# Patient Record
Sex: FEMALE | Race: WHITE | NOT HISPANIC OR LATINO | Employment: FULL TIME | URBAN - NONMETROPOLITAN AREA
[De-identification: names, ages, dates, MRNs, and addresses within clinical notes are randomized per-mention and may not be internally consistent; named-entity substitution may affect disease eponyms.]

---

## 2018-02-23 ENCOUNTER — OFFICE VISIT (OUTPATIENT)
Dept: FAMILY MEDICINE CLINIC | Facility: CLINIC | Age: 43
End: 2018-02-23

## 2018-02-23 VITALS
HEART RATE: 99 BPM | HEIGHT: 61 IN | TEMPERATURE: 98.1 F | SYSTOLIC BLOOD PRESSURE: 148 MMHG | OXYGEN SATURATION: 89 % | DIASTOLIC BLOOD PRESSURE: 92 MMHG | WEIGHT: 151 LBS | BODY MASS INDEX: 28.51 KG/M2

## 2018-02-23 DIAGNOSIS — I10 BENIGN HYPERTENSION: Primary | ICD-10-CM

## 2018-02-23 DIAGNOSIS — M54.6 ACUTE THORACIC BACK PAIN, UNSPECIFIED BACK PAIN LATERALITY: ICD-10-CM

## 2018-02-23 DIAGNOSIS — M79.602 LEFT ARM PAIN: ICD-10-CM

## 2018-02-23 DIAGNOSIS — Z00.00 ROUTINE ADULT HEALTH MAINTENANCE: ICD-10-CM

## 2018-02-23 DIAGNOSIS — R07.9 CHEST PAIN, UNSPECIFIED TYPE: ICD-10-CM

## 2018-02-23 PROBLEM — R20.2 TINGLING: Status: ACTIVE | Noted: 2018-02-23

## 2018-02-23 PROCEDURE — 99203 OFFICE O/P NEW LOW 30 MIN: CPT | Performed by: PHYSICIAN ASSISTANT

## 2018-02-23 PROCEDURE — 93000 ELECTROCARDIOGRAM COMPLETE: CPT | Performed by: PHYSICIAN ASSISTANT

## 2018-02-23 RX ORDER — LISINOPRIL 10 MG/1
10 TABLET ORAL DAILY
Qty: 30 TABLET | Refills: 2 | Status: SHIPPED | OUTPATIENT
Start: 2018-02-23 | End: 2018-06-07 | Stop reason: SDUPTHER

## 2018-02-23 RX ORDER — IBUPROFEN 200 MG
200 TABLET ORAL EVERY 6 HOURS PRN
COMMUNITY

## 2018-02-23 NOTE — PROGRESS NOTES
Subjective   Vashti Hair is a 42 y.o. female WHO PRESENTS AS A NEW PATIENT- ELEVATED BP AND CHEST PAIN X 2-3 WEEKS.     History of Present Illness     REPORTS SLEPT REALLY WELL LAST NIGHT.     USUALLY WAKES UP AT 3-4 AM AND ALARM DOESN'T GO OFF UNTIL 5 AM. MIND RACING AND CANNOT GO BACK TO SLEEP. NOT MORE BUSY, BUT IS WORRIED ABOUT DIFFERENT THINGS. DAUGHTER DRIVING AND GETTING OLDER AND LOOKING AT COLLEGES.     BILATERAL UPPER CHEST PAIN THAT GOES INTO SHOULDERS AND SUPRACLAVICULAR AREA. LEFT > RIGHT ARMS. PRESSURE INTO UPPER BACK. NOT ASSOCIATED WITH SOA, PALP, NAUSEA, DIZZINESS, DIAPHORESIS.     BLOOD PRESSURE- ELEVATED WITH ALL 3 PREGNANCIES. HAS NOT BEEN ELEVATED WHEN NOT PREGNANT. NOTED ELEVATED AT EYE DR 2/16/18- 160/103 THEN TOOK 2/19 WITH ANOTHER CUFF 138/110 THEN NURSE CHECKED AND IT /88 /90.     NO GI/ ISSUES. NO HEADACHES. NO CHANGE IN VISION- ABNORMALITIES OF EYE ON EXAM. OTHER NEUROLOGICAL SYMPTOMS.     HAS NOTED INCREASING FORGETFULNESS OVER THE LAST 17 YEARS. STAYED HOME WITH CHILDREN THE 1ST 5 YEARS.      SISTER WITH MENOPAUSE AT AGE 37 AND MOTHER WITH MENOPAUSE EARLY 40S. FATHER WITH HTN. MENSES NORMAL - ONCE MONTHLY. 3 DAYS.      The following portions of the patient's history were reviewed and updated as appropriate: allergies, current medications, past family history, past medical history, past social history, past surgical history and problem list.    Review of Systems   Respiratory: Positive for chest tightness.    Musculoskeletal: Positive for back pain.   Psychiatric/Behavioral: Positive for sleep disturbance. The patient is nervous/anxious.    All other systems reviewed and are negative.      Objective   Physical Exam   Constitutional: She is oriented to person, place, and time. She appears well-developed and well-nourished. No distress.   HENT:   Head: Normocephalic and atraumatic.   Right Ear: Hearing, tympanic membrane, external ear and ear canal normal.   Left Ear:  Hearing, tympanic membrane, external ear and ear canal normal.   Nose: Nose normal.   Mouth/Throat: Oropharynx is clear and moist.   Eyes: Conjunctivae, EOM and lids are normal. Pupils are equal, round, and reactive to light.   Neck: Neck supple. No JVD present. Carotid bruit is not present. No tracheal deviation present. No thyroid mass and no thyromegaly present.   Cardiovascular: Normal rate, regular rhythm, normal heart sounds and intact distal pulses.  Exam reveals no gallop and no friction rub.    No murmur heard.  Pulses:       Radial pulses are 2+ on the right side, and 2+ on the left side.        Posterior tibial pulses are 2+ on the right side, and 2+ on the left side.   Pulmonary/Chest: Effort normal and breath sounds normal. No respiratory distress. She has no wheezes. She has no rhonchi. She has no rales.   Abdominal: Soft. Normal aorta and bowel sounds are normal. She exhibits no distension and no abdominal bruit. There is no hepatosplenomegaly. There is no tenderness. There is no rigidity, no rebound and no guarding. No hernia.   Musculoskeletal: Normal range of motion. She exhibits no edema, tenderness or deformity.   Normal strength.   Lymphadenopathy:     She has no cervical adenopathy.   Neurological: She is alert and oriented to person, place, and time. She has normal strength and normal reflexes. She displays normal reflexes. No cranial nerve deficit or sensory deficit. She exhibits normal muscle tone. Coordination and gait normal.   Skin: Skin is warm and dry. No rash noted. She is not diaphoretic. No erythema.   Psychiatric: She has a normal mood and affect. Her speech is normal and behavior is normal. Judgment and thought content normal. Cognition and memory are normal.     ECG 12 Lead  Date/Time: 2/23/2018 4:43 PM  Performed by: ELIS SCHROEDER  Authorized by: ELIS SCHROEDER   Comparison: not compared with previous ECG   Previous ECG: no previous ECG available  Rhythm: sinus rhythm  Rate:  normal  Conduction: non-specific intraventricular conduction delay  ST Elevation: II, V1 and V2  ST Depression: I and V5  T Waves: T waves normal  QRS axis: normal  Clinical impression: non-specific ECG  Comments: INDICATION: HTN AND CHEST PAIN WITH RADIATION TO SHOULDERS AND LEFT ARM          Assessment/Plan   Vashti was seen today for establish care and hypertension.    Diagnoses and all orders for this visit:    Benign hypertension  -     lisinopril (PRINIVIL,ZESTRIL) 10 MG tablet; Take 1 tablet by mouth Daily.  -     CBC & Differential  -     Comprehensive Metabolic Panel  -     Lipid Panel With LDL / HDL Ratio  -     Thyroid Panel With TSH  -     Vitamin D 25 Hydroxy  -     Urinalysis With Microscopic - Urine, Clean Catch  -     Ambulatory Referral to Cardiology  -     ECG 12 Lead    Routine adult health maintenance  -     CBC & Differential  -     Comprehensive Metabolic Panel  -     Lipid Panel With LDL / HDL Ratio  -     Thyroid Panel With TSH  -     Vitamin D 25 Hydroxy  -     Urinalysis With Microscopic - Urine, Clean Catch    Chest pain, unspecified type  -     Ambulatory Referral to Cardiology  -     ECG 12 Lead    Acute thoracic back pain, unspecified back pain laterality  -     Ambulatory Referral to Cardiology  -     ECG 12 Lead    Left arm pain  -     Ambulatory Referral to Cardiology  -     ECG 12 Lead      Patient Instructions   42 YEAR OLD MALE WHO PRESENTS TODAY AS A NEW PATIENT. SHE HAS PMH SIGNIFICANT FOR GESTATIONAL HTN WITH ALL 3 PREGNANCIES. SHE HAS OTHERWISE BEEN HEALTHY. PATIENT HAS BEEN OTHERWISE HEALTHY WITH INTERMITTENT ELEVATED BLOOD PRESSURES. X 2-3 WEEKS, SHE HAS HAD ELEVATED BLOOD PRESSURES AND INTERMITTENT PRESSURE BILATERALLY ACROSS CHEST, SHOULDERS, AND ARMS LEFT > RIGHT. SHE HAS NO ASSOCIATED SOA, PALP, WEAKNESS, DIZZINESS, NEUROLOGICAL SYMPTOMS. EKG TODAY IS BORDERLINE. PATIENT TO SEE CARDIOLOGY WITH SYMPTOMS AND EKG. PATIENT TO GO TO ER IF WORSENING SYMPTOMS OR ASSOCIATED  SYMPTOMS, OR CHANGE IN SYMPTOMS. I WILL START LISINOPRIL 10 MG ONCE DAILY. TO MONITOR BLOOD PRESSURE. PATIENT HAS HAD INCREASED STRESS AND WORRY AS WELL WITH CHILDREN. I ADVISED TO ENSURE SHE IS GETTING GOOD SLEEP AT NIGHT. WE WILL CONSIDER MEDICATION FOR ANXIETY/STRESS IF CONTINUED SYMPTOMS. PATIENT ALSO NEEDS FASTING LABS- TO RETURN FOR THIS AND CALL IF NO RESULTS IN 1 WEEK. PATIENT TO UPDATE ME ON BP IN 1 MONTH- WILL ALSO NEED BMP IN 2-4 WEEKS AFTER STARTING LISINOPRIL. COUNSELED ON DIET, EXERCISE, AND WEIGHT LOSS TODAY. PATIENT WILL WORK DILIGENTLY ON THIS.

## 2018-02-25 NOTE — PATIENT INSTRUCTIONS
42 YEAR OLD MALE WHO PRESENTS TODAY AS A NEW PATIENT. SHE HAS PMH SIGNIFICANT FOR GESTATIONAL HTN WITH ALL 3 PREGNANCIES. SHE HAS OTHERWISE BEEN HEALTHY. PATIENT HAS BEEN OTHERWISE HEALTHY WITH INTERMITTENT ELEVATED BLOOD PRESSURES. X 2-3 WEEKS, SHE HAS HAD ELEVATED BLOOD PRESSURES AND INTERMITTENT PRESSURE BILATERALLY ACROSS CHEST, SHOULDERS, AND ARMS LEFT > RIGHT. SHE HAS NO ASSOCIATED SOA, PALP, WEAKNESS, DIZZINESS, NEUROLOGICAL SYMPTOMS. EKG TODAY IS BORDERLINE. PATIENT TO SEE CARDIOLOGY WITH SYMPTOMS AND EKG. PATIENT TO GO TO ER IF WORSENING SYMPTOMS OR ASSOCIATED SYMPTOMS, OR CHANGE IN SYMPTOMS. I WILL START LISINOPRIL 10 MG ONCE DAILY. TO MONITOR BLOOD PRESSURE. PATIENT HAS HAD INCREASED STRESS AND WORRY AS WELL WITH CHILDREN. I ADVISED TO ENSURE SHE IS GETTING GOOD SLEEP AT NIGHT. WE WILL CONSIDER MEDICATION FOR ANXIETY/STRESS IF CONTINUED SYMPTOMS. PATIENT ALSO NEEDS FASTING LABS- TO RETURN FOR THIS AND CALL IF NO RESULTS IN 1 WEEK. PATIENT TO UPDATE ME ON BP IN 1 MONTH- WILL ALSO NEED BMP IN 2-4 WEEKS AFTER STARTING LISINOPRIL. COUNSELED ON DIET, EXERCISE, AND WEIGHT LOSS TODAY. PATIENT WILL WORK DILIGENTLY ON THIS.

## 2018-02-27 LAB
25(OH)D3+25(OH)D2 SERPL-MCNC: 21.9 NG/ML (ref 30–100)
ALBUMIN SERPL-MCNC: 4.3 G/DL (ref 3.5–5.2)
ALBUMIN/GLOB SERPL: 1.6 G/DL
ALP SERPL-CCNC: 44 U/L (ref 39–117)
ALT SERPL-CCNC: 15 U/L (ref 1–33)
APPEARANCE UR: CLEAR
AST SERPL-CCNC: 13 U/L (ref 1–32)
BACTERIA #/AREA URNS HPF: ABNORMAL /HPF
BASOPHILS # BLD AUTO: 0.05 10*3/MM3 (ref 0–0.2)
BASOPHILS NFR BLD AUTO: 0.6 % (ref 0–1.5)
BILIRUB SERPL-MCNC: 0.3 MG/DL (ref 0.1–1.2)
BILIRUB UR QL STRIP: NEGATIVE
BUN SERPL-MCNC: 13 MG/DL (ref 6–20)
BUN/CREAT SERPL: 15.5 (ref 7–25)
CALCIUM SERPL-MCNC: 9.3 MG/DL (ref 8.6–10.5)
CASTS URNS MICRO: ABNORMAL
CHLORIDE SERPL-SCNC: 100 MMOL/L (ref 98–107)
CHOLEST SERPL-MCNC: 166 MG/DL (ref 0–200)
CO2 SERPL-SCNC: 24.9 MMOL/L (ref 22–29)
COLOR UR: (no result)
CREAT SERPL-MCNC: 0.84 MG/DL (ref 0.57–1)
EOSINOPHIL # BLD AUTO: 0.28 10*3/MM3 (ref 0–0.7)
EOSINOPHIL NFR BLD AUTO: 3.3 % (ref 0.3–6.2)
EPI CELLS #/AREA URNS HPF: ABNORMAL /HPF
ERYTHROCYTE [DISTWIDTH] IN BLOOD BY AUTOMATED COUNT: 14.4 % (ref 11.7–13)
FT4I SERPL CALC-MCNC: 2 (ref 1.2–4.9)
GFR SERPLBLD CREATININE-BSD FMLA CKD-EPI: 74 ML/MIN/1.73
GFR SERPLBLD CREATININE-BSD FMLA CKD-EPI: 90 ML/MIN/1.73
GLOBULIN SER CALC-MCNC: 2.7 GM/DL
GLUCOSE SERPL-MCNC: 97 MG/DL (ref 65–99)
GLUCOSE UR QL: NEGATIVE
HCT VFR BLD AUTO: 42.9 % (ref 35.6–45.5)
HDLC SERPL-MCNC: 54 MG/DL (ref 40–60)
HGB BLD-MCNC: 13.8 G/DL (ref 11.9–15.5)
HGB UR QL STRIP: NEGATIVE
IMM GRANULOCYTES # BLD: 0.02 10*3/MM3 (ref 0–0.03)
IMM GRANULOCYTES NFR BLD: 0.2 % (ref 0–0.5)
KETONES UR QL STRIP: NEGATIVE
LDLC SERPL CALC-MCNC: 96 MG/DL (ref 0–100)
LDLC/HDLC SERPL: 1.77 {RATIO}
LEUKOCYTE ESTERASE UR QL STRIP: (no result)
LYMPHOCYTES # BLD AUTO: 2.51 10*3/MM3 (ref 0.9–4.8)
LYMPHOCYTES NFR BLD AUTO: 29.8 % (ref 19.6–45.3)
MCH RBC QN AUTO: 29.9 PG (ref 26.9–32)
MCHC RBC AUTO-ENTMCNC: 32.2 G/DL (ref 32.4–36.3)
MCV RBC AUTO: 92.9 FL (ref 80.5–98.2)
MONOCYTES # BLD AUTO: 0.67 10*3/MM3 (ref 0.2–1.2)
MONOCYTES NFR BLD AUTO: 7.9 % (ref 5–12)
NEUTROPHILS # BLD AUTO: 4.9 10*3/MM3 (ref 1.9–8.1)
NEUTROPHILS NFR BLD AUTO: 58.2 % (ref 42.7–76)
NITRITE UR QL STRIP: NEGATIVE
PH UR STRIP: 5.5 [PH] (ref 5–8)
PLATELET # BLD AUTO: 358 10*3/MM3 (ref 140–500)
POTASSIUM SERPL-SCNC: 3.8 MMOL/L (ref 3.5–5.2)
PROT SERPL-MCNC: 7 G/DL (ref 6–8.5)
PROT UR QL STRIP: (no result)
RBC # BLD AUTO: 4.62 10*6/MM3 (ref 3.9–5.2)
RBC #/AREA URNS HPF: ABNORMAL /HPF
SODIUM SERPL-SCNC: 140 MMOL/L (ref 136–145)
SP GR UR: 1.03 (ref 1–1.03)
T3RU NFR SERPL: 25 % (ref 24–39)
T4 SERPL-MCNC: 7.8 UG/DL (ref 4.5–12)
TRIGL SERPL-MCNC: 81 MG/DL (ref 0–150)
TSH SERPL DL<=0.005 MIU/L-ACNC: 7.38 UIU/ML (ref 0.45–4.5)
UROBILINOGEN UR STRIP-MCNC: (no result) MG/DL
VLDLC SERPL CALC-MCNC: 16.2 MG/DL (ref 5–40)
WBC # BLD AUTO: 8.43 10*3/MM3 (ref 4.5–10.7)
WBC #/AREA URNS HPF: ABNORMAL /HPF

## 2018-02-28 DIAGNOSIS — R82.90 ABNORMAL URINALYSIS: ICD-10-CM

## 2018-02-28 DIAGNOSIS — E03.9 HYPOTHYROIDISM, UNSPECIFIED TYPE: Primary | ICD-10-CM

## 2018-02-28 RX ORDER — LEVOTHYROXINE SODIUM 0.03 MG/1
25 TABLET ORAL DAILY
Qty: 30 TABLET | Refills: 1 | Status: SHIPPED | OUTPATIENT
Start: 2018-02-28 | End: 2018-06-07 | Stop reason: SDUPTHER

## 2018-03-13 ENCOUNTER — OFFICE VISIT (OUTPATIENT)
Dept: CARDIOLOGY | Facility: CLINIC | Age: 43
End: 2018-03-13

## 2018-03-13 VITALS
BODY MASS INDEX: 28.92 KG/M2 | DIASTOLIC BLOOD PRESSURE: 88 MMHG | WEIGHT: 153.2 LBS | SYSTOLIC BLOOD PRESSURE: 140 MMHG | RESPIRATION RATE: 16 BRPM | HEIGHT: 61 IN | HEART RATE: 80 BPM

## 2018-03-13 DIAGNOSIS — R07.2 PRECORDIAL PAIN: Primary | ICD-10-CM

## 2018-03-13 DIAGNOSIS — I10 ESSENTIAL HYPERTENSION: ICD-10-CM

## 2018-03-13 PROCEDURE — 93000 ELECTROCARDIOGRAM COMPLETE: CPT | Performed by: INTERNAL MEDICINE

## 2018-03-13 PROCEDURE — 99204 OFFICE O/P NEW MOD 45 MIN: CPT | Performed by: INTERNAL MEDICINE

## 2018-03-13 NOTE — PROGRESS NOTES
PATIENTINFORMATION    Date of Office Visit: 2018  Encounter Provider: Karen Ludwig MD  Place of Service: [facility] CARDIOLOGY  Patient Name: Vashti Hair  : 1975    Subjective:     Encounter Date:2018      Patient ID: Vashti Hair is a 42 y.o. female.      History of Present Illness    This is a pleasant lady with a history of hypertension, which started when she was pregnant.  Recently, she felt her blood pressure was up and indeed it was.  She has been started on lisinopril and now reports good blood pressure control at home.  She mentioned to her primary doctor that she was having some chest pressure.  She said it felt tight in her chest and shoulders.  This has gotten better since her blood pressure has been treated.  She thinks it may be related to anxiety.  It would generally come on when she was at rest.  She did not note any exacerbating or relieving factors.  She is generally active but has not been exercising regularly recently; however, when she does exert herself, she does not have any symptoms.        Review of Systems   Constitution: Negative for fever, malaise/fatigue, weight gain and weight loss.   HENT: Negative for ear pain, hearing loss, nosebleeds and sore throat.    Eyes: Negative for double vision, pain, vision loss in left eye and vision loss in right eye.   Cardiovascular:        See history of present illness.   Respiratory: Negative for cough, shortness of breath, sleep disturbances due to breathing, snoring and wheezing.    Endocrine: Negative for cold intolerance, heat intolerance and polyuria.   Skin: Negative for itching, poor wound healing and rash.   Musculoskeletal: Negative for joint pain, joint swelling and myalgias.   Gastrointestinal: Negative for abdominal pain, diarrhea, hematochezia, nausea and vomiting.   Genitourinary: Negative for hematuria and hesitancy.   Neurological: Negative for numbness, paresthesias and  "seizures.   Psychiatric/Behavioral: Negative for depression. The patient is nervous/anxious.            ECG 12 Lead  Date/Time: 3/13/2018 12:14 PM  Performed by: MANISHA MOSS  Authorized by: MANISHA MOSS   Comparison: compared with previous ECG from 2/23/2018  Similar to previous ECG  Rhythm: sinus rhythm  BPM: 80  Conduction: conduction normal  ST Segments: ST segments normal  T Waves: T waves normal  Clinical impression: normal ECG               Objective:     /88 (BP Location: Right arm, Patient Position: Sitting, Cuff Size: Adult)   Pulse 80   Resp 16   Ht 154.9 cm (61\")   Wt 69.5 kg (153 lb 3.2 oz)   BMI 28.95 kg/m²  Body mass index is 28.95 kg/m².     Physical Exam   Constitutional: She appears well-developed.   HENT:   Head: Normocephalic and atraumatic.   Eyes: Conjunctivae and lids are normal. Pupils are equal, round, and reactive to light. Lids are everted and swept, no foreign bodies found.   Neck: Normal range of motion. No JVD present. Carotid bruit is not present. No tracheal deviation present. No thyroid mass present.   Cardiovascular: Normal rate, regular rhythm and normal heart sounds.    Pulses:       Dorsalis pedis pulses are 2+ on the right side, and 2+ on the left side.   Pulmonary/Chest: Effort normal and breath sounds normal.   Abdominal: Normal appearance and bowel sounds are normal.   Musculoskeletal: Normal range of motion.   Neurological: She is alert. She has normal strength.   Skin: Skin is warm, dry and intact.   Psychiatric: She has a normal mood and affect. Her behavior is normal.   Vitals reviewed.      Lab Review:  Reviewed recent labs      Assessment/Plan:       1. Chest pain.  This has resolved.  It resolved with starting treatment for her blood pressure.  I encouraged her to start to exercise.  I told her, if she is having any symptoms with exertion, to let me know and we will get her back in for a stress test.  However, her chest pain was asymptomatic, so I do " not think any testing needs to be scheduled at this time, especially since she is no longer having symptoms.    2. Hypertension.  Her blood pressure is a bit high here in the office here today, but she reports good control at home.  I encouraged her to continue to monitor it.    I will see her back as needed.  I did encourage her to call me if she has any exertional symptoms.        Orders Placed This Encounter   Procedures   • ECG 12 Lead     This order was created via procedure documentation      Vashti Hair   Home Medication Instructions HARJIT:    Printed on:03/13/18 8375   Medication Information                      ibuprofen (ADVIL,MOTRIN) 200 MG tablet  Take 200 mg by mouth Every 6 (Six) Hours As Needed for Mild Pain .             levothyroxine (SYNTHROID, LEVOTHROID) 25 MCG tablet  Take 1 tablet by mouth Daily.             lisinopril (PRINIVIL,ZESTRIL) 10 MG tablet  Take 1 tablet by mouth Daily.                        Karen Ludwig MD  03/13/18  12:15 PM

## 2018-04-04 ENCOUNTER — TELEPHONE (OUTPATIENT)
Dept: FAMILY MEDICINE CLINIC | Facility: CLINIC | Age: 43
End: 2018-04-04

## 2018-04-04 DIAGNOSIS — E03.9 ACQUIRED HYPOTHYROIDISM: Primary | ICD-10-CM

## 2018-04-04 DIAGNOSIS — R31.9 HEMATURIA, UNSPECIFIED TYPE: ICD-10-CM

## 2018-04-04 NOTE — TELEPHONE ENCOUNTER
----- Message from Beatrice Camargo sent at 4/4/2018  2:20 PM EDT -----  Patient has appt for Tuesday 4-10-18 for a tsh dilcia and ua dilcia, I need an order  Thank you

## 2018-06-06 ENCOUNTER — OFFICE VISIT (OUTPATIENT)
Dept: FAMILY MEDICINE CLINIC | Facility: CLINIC | Age: 43
End: 2018-06-06

## 2018-06-06 VITALS
HEART RATE: 68 BPM | OXYGEN SATURATION: 98 % | HEIGHT: 61 IN | SYSTOLIC BLOOD PRESSURE: 120 MMHG | WEIGHT: 153.6 LBS | DIASTOLIC BLOOD PRESSURE: 78 MMHG | TEMPERATURE: 98 F | BODY MASS INDEX: 29 KG/M2

## 2018-06-06 DIAGNOSIS — E03.9 ACQUIRED HYPOTHYROIDISM: ICD-10-CM

## 2018-06-06 DIAGNOSIS — I10 ESSENTIAL HYPERTENSION: Primary | ICD-10-CM

## 2018-06-06 DIAGNOSIS — E55.9 VITAMIN D DEFICIENCY: ICD-10-CM

## 2018-06-06 PROCEDURE — 99213 OFFICE O/P EST LOW 20 MIN: CPT | Performed by: PHYSICIAN ASSISTANT

## 2018-06-06 NOTE — PROGRESS NOTES
I have reviewed the notes, assessments, and/or procedures performed by Sushma Parra, I concur with her/his documentation of Vashti Hair.

## 2018-06-06 NOTE — PATIENT INSTRUCTIONS
43 YEAR OLD FEMALE WHO PRESENTS TODAY IN FOLLOW UP OF HTN, HYPOTHYROIDISM, AND VITAMIN D DEFICIENCY. SHE HAS BEEN DOING WELL ON MEDICATION. BP HAS BEEN WELL CONTROLLED AND NO AE WITH MEDICATION. SHE HAS NOTED LESS IRRITABILITY WITH SYNTHROID BUT HAS BEEN OUT ABOUT 5 DAYS. SHE HAS NOT TAKEN VITAMIN D BUT HAS BEEN IN THE SUN MORE. BLOOD PRESSURE IS NORMAL TODAY- CONTINUE LISINOPRIL 10 MG ONCE DAILY. I WILL CHECK LABS TODAY- CALL IF NO RESULTS IN 1 WEEK. IF THYROID IS UNDER-MEDICATED, I WILL HAVE HER RECHECK IN 6 WEEKS AFTER TAKING DAILY. IF NORMAL, I WILL HAVE HER FOLLOW UP IN 6 MONTHS. TO CALL OR RETURN SOONER IF ANY CONCERNS, SYMPTOMS, OR ELEVATED BLOOD PRESSURE.

## 2018-06-06 NOTE — PROGRESS NOTES
Subjective   Vashti Hair is a 43 y.o. female. Patient seen for medication check for hypertension, hypothyroid     History of Present Illness     FEELING GOOD. HAS HAD LESS IRRITABILITY WITH STARTING SYNTHROID. NO AE. NO FATIGUE, CONSTIPATION, DRY SKIN, DEPRESSIVE SYMPTOMS, ANXIETY, DIARRHEA, PALPITATIONS. HAS BEEN OUT OF MEDICATION FOR 5 DAYS.     NO AE TO LISINOPRIL. DOING WELL. IF FORGETS MEDICATION, CAN TELL BP IS ELEVATED    HAS NOT BEEN TAKING VITAMIN D. HAS BEEN IN THE SUN MORE OFTEN.     The following portions of the patient's history were reviewed and updated as appropriate: allergies, current medications, past family history, past medical history, past social history, past surgical history and problem list.    Review of Systems   All other systems reviewed and are negative.      Objective   Physical Exam   Constitutional: She is oriented to person, place, and time. She appears well-developed and well-nourished.   HENT:   Head: Normocephalic and atraumatic.   Right Ear: External ear normal.   Left Ear: External ear normal.   Nose: Nose normal.   Eyes: Conjunctivae and lids are normal.   Neck: Neck supple. Carotid bruit is not present. No thyromegaly present.   Cardiovascular: Normal rate, regular rhythm, normal heart sounds and intact distal pulses.  Exam reveals no gallop and no friction rub.    No murmur heard.  Pulmonary/Chest: Effort normal and breath sounds normal. No respiratory distress. She has no wheezes. She has no rhonchi. She has no rales.   Musculoskeletal: She exhibits no edema or deformity.   Neurological: She is alert and oriented to person, place, and time. Gait normal.   Skin: Skin is warm and dry.   Psychiatric: She has a normal mood and affect. Her speech is normal and behavior is normal. Judgment and thought content normal. Cognition and memory are normal.   Nursing note and vitals reviewed.      Assessment/Plan   Vashti was seen today for follow-up.    Diagnoses and all orders for  this visit:    Essential hypertension  -     CBC & Differential  -     Comprehensive Metabolic Panel    Acquired hypothyroidism  -     CBC & Differential  -     Thyroid Panel With TSH    Vitamin D deficiency  -     Vitamin D 25 Hydroxy      Patient Instructions   43 YEAR OLD FEMALE WHO PRESENTS TODAY IN FOLLOW UP OF HTN, HYPOTHYROIDISM, AND VITAMIN D DEFICIENCY. SHE HAS BEEN DOING WELL ON MEDICATION. BP HAS BEEN WELL CONTROLLED AND NO AE WITH MEDICATION. SHE HAS NOTED LESS IRRITABILITY WITH SYNTHROID BUT HAS BEEN OUT ABOUT 5 DAYS. SHE HAS NOT TAKEN VITAMIN D BUT HAS BEEN IN THE SUN MORE. BLOOD PRESSURE IS NORMAL TODAY- CONTINUE LISINOPRIL 10 MG ONCE DAILY. I WILL CHECK LABS TODAY- CALL IF NO RESULTS IN 1 WEEK. IF THYROID IS UNDER-MEDICATED, I WILL HAVE HER RECHECK IN 6 WEEKS AFTER TAKING DAILY. IF NORMAL, I WILL HAVE HER FOLLOW UP IN 6 MONTHS. TO CALL OR RETURN SOONER IF ANY CONCERNS, SYMPTOMS, OR ELEVATED BLOOD PRESSURE.

## 2018-06-07 DIAGNOSIS — I10 BENIGN HYPERTENSION: ICD-10-CM

## 2018-06-07 DIAGNOSIS — E03.9 HYPOTHYROIDISM, UNSPECIFIED TYPE: ICD-10-CM

## 2018-06-07 LAB
25(OH)D3+25(OH)D2 SERPL-MCNC: 22.4 NG/ML (ref 30–100)
ALBUMIN SERPL-MCNC: 4.3 G/DL (ref 3.5–5.2)
ALBUMIN/GLOB SERPL: 1.5 G/DL
ALP SERPL-CCNC: 43 U/L (ref 39–117)
ALT SERPL-CCNC: 13 U/L (ref 1–33)
AST SERPL-CCNC: 11 U/L (ref 1–32)
BASOPHILS # BLD AUTO: 0.02 10*3/MM3 (ref 0–0.2)
BASOPHILS NFR BLD AUTO: 0.3 % (ref 0–1.5)
BILIRUB SERPL-MCNC: 0.4 MG/DL (ref 0.1–1.2)
BUN SERPL-MCNC: 20 MG/DL (ref 6–20)
BUN/CREAT SERPL: 23.8 (ref 7–25)
CALCIUM SERPL-MCNC: 9.4 MG/DL (ref 8.6–10.5)
CHLORIDE SERPL-SCNC: 104 MMOL/L (ref 98–107)
CO2 SERPL-SCNC: 23 MMOL/L (ref 22–29)
CREAT SERPL-MCNC: 0.84 MG/DL (ref 0.57–1)
EOSINOPHIL # BLD AUTO: 0.16 10*3/MM3 (ref 0–0.7)
EOSINOPHIL NFR BLD AUTO: 2.2 % (ref 0.3–6.2)
ERYTHROCYTE [DISTWIDTH] IN BLOOD BY AUTOMATED COUNT: 14.2 % (ref 11.7–13)
FT4I SERPL CALC-MCNC: 2.3 (ref 1.2–4.9)
GFR SERPLBLD CREATININE-BSD FMLA CKD-EPI: 74 ML/MIN/1.73
GFR SERPLBLD CREATININE-BSD FMLA CKD-EPI: 89 ML/MIN/1.73
GLOBULIN SER CALC-MCNC: 2.8 GM/DL
GLUCOSE SERPL-MCNC: 92 MG/DL (ref 65–99)
HCT VFR BLD AUTO: 42.6 % (ref 35.6–45.5)
HGB BLD-MCNC: 13.6 G/DL (ref 11.9–15.5)
IMM GRANULOCYTES # BLD: 0.02 10*3/MM3 (ref 0–0.03)
IMM GRANULOCYTES NFR BLD: 0.3 % (ref 0–0.5)
LYMPHOCYTES # BLD AUTO: 1.74 10*3/MM3 (ref 0.9–4.8)
LYMPHOCYTES NFR BLD AUTO: 24 % (ref 19.6–45.3)
MCH RBC QN AUTO: 29.6 PG (ref 26.9–32)
MCHC RBC AUTO-ENTMCNC: 31.9 G/DL (ref 32.4–36.3)
MCV RBC AUTO: 92.6 FL (ref 80.5–98.2)
MONOCYTES # BLD AUTO: 0.54 10*3/MM3 (ref 0.2–1.2)
MONOCYTES NFR BLD AUTO: 7.4 % (ref 5–12)
NEUTROPHILS # BLD AUTO: 4.8 10*3/MM3 (ref 1.9–8.1)
NEUTROPHILS NFR BLD AUTO: 66.1 % (ref 42.7–76)
PLATELET # BLD AUTO: 336 10*3/MM3 (ref 140–500)
POTASSIUM SERPL-SCNC: 4.5 MMOL/L (ref 3.5–5.2)
PROT SERPL-MCNC: 7.1 G/DL (ref 6–8.5)
RBC # BLD AUTO: 4.6 10*6/MM3 (ref 3.9–5.2)
SODIUM SERPL-SCNC: 141 MMOL/L (ref 136–145)
T3RU NFR SERPL: 28 % (ref 24–39)
T4 SERPL-MCNC: 8.1 UG/DL (ref 4.5–12)
TSH SERPL DL<=0.005 MIU/L-ACNC: 4.71 UIU/ML (ref 0.45–4.5)
WBC # BLD AUTO: 7.26 10*3/MM3 (ref 4.5–10.7)

## 2018-06-07 RX ORDER — LISINOPRIL 10 MG/1
10 TABLET ORAL DAILY
Qty: 30 TABLET | Refills: 2 | Status: SHIPPED | OUTPATIENT
Start: 2018-06-07 | End: 2018-09-13 | Stop reason: SDUPTHER

## 2018-06-07 RX ORDER — LEVOTHYROXINE SODIUM 0.03 MG/1
TABLET ORAL
Qty: 30 TABLET | Refills: 0 | Status: CANCELLED | OUTPATIENT
Start: 2018-06-07

## 2018-06-07 RX ORDER — LISINOPRIL 10 MG/1
TABLET ORAL
Qty: 30 TABLET | Refills: 0 | Status: CANCELLED | OUTPATIENT
Start: 2018-06-07

## 2018-06-07 RX ORDER — LEVOTHYROXINE SODIUM 0.03 MG/1
25 TABLET ORAL DAILY
Qty: 30 TABLET | Refills: 0 | Status: SHIPPED | OUTPATIENT
Start: 2018-06-07 | End: 2018-07-17 | Stop reason: SDUPTHER

## 2018-07-12 DIAGNOSIS — E03.9 ACQUIRED HYPOTHYROIDISM: Primary | ICD-10-CM

## 2018-07-13 LAB — TSH SERPL DL<=0.005 MIU/L-ACNC: 2.66 MIU/ML (ref 0.27–4.2)

## 2018-07-17 DIAGNOSIS — E03.9 HYPOTHYROIDISM, UNSPECIFIED TYPE: ICD-10-CM

## 2018-07-17 RX ORDER — LEVOTHYROXINE SODIUM 0.03 MG/1
25 TABLET ORAL DAILY
Qty: 30 TABLET | Refills: 2 | Status: SHIPPED | OUTPATIENT
Start: 2018-07-17 | End: 2018-10-30 | Stop reason: SDUPTHER

## 2018-09-13 DIAGNOSIS — I10 BENIGN HYPERTENSION: ICD-10-CM

## 2018-09-13 RX ORDER — LISINOPRIL 10 MG/1
TABLET ORAL
Qty: 30 TABLET | Refills: 0 | Status: SHIPPED | OUTPATIENT
Start: 2018-09-13 | End: 2018-10-30 | Stop reason: SDUPTHER

## 2018-10-30 DIAGNOSIS — E03.9 HYPOTHYROIDISM, UNSPECIFIED TYPE: ICD-10-CM

## 2018-10-30 DIAGNOSIS — I10 BENIGN HYPERTENSION: ICD-10-CM

## 2018-10-30 RX ORDER — LEVOTHYROXINE SODIUM 0.03 MG/1
TABLET ORAL
Qty: 30 TABLET | Refills: 1 | Status: SHIPPED | OUTPATIENT
Start: 2018-10-30 | End: 2018-12-10 | Stop reason: SDUPTHER

## 2018-10-30 RX ORDER — LISINOPRIL 10 MG/1
TABLET ORAL
Qty: 30 TABLET | Refills: 1 | Status: SHIPPED | OUTPATIENT
Start: 2018-10-30 | End: 2019-01-16 | Stop reason: SDUPTHER

## 2018-12-07 ENCOUNTER — OFFICE VISIT (OUTPATIENT)
Dept: FAMILY MEDICINE CLINIC | Facility: CLINIC | Age: 43
End: 2018-12-07

## 2018-12-07 VITALS
WEIGHT: 158 LBS | OXYGEN SATURATION: 99 % | TEMPERATURE: 98.3 F | DIASTOLIC BLOOD PRESSURE: 80 MMHG | HEART RATE: 78 BPM | SYSTOLIC BLOOD PRESSURE: 122 MMHG | HEIGHT: 61 IN | BODY MASS INDEX: 29.83 KG/M2

## 2018-12-07 DIAGNOSIS — E55.9 VITAMIN D DEFICIENCY: ICD-10-CM

## 2018-12-07 DIAGNOSIS — E03.9 ACQUIRED HYPOTHYROIDISM: ICD-10-CM

## 2018-12-07 DIAGNOSIS — I10 BENIGN HYPERTENSION: Primary | ICD-10-CM

## 2018-12-07 PROCEDURE — 90674 CCIIV4 VAC NO PRSV 0.5 ML IM: CPT | Performed by: PHYSICIAN ASSISTANT

## 2018-12-07 PROCEDURE — 90471 IMMUNIZATION ADMIN: CPT | Performed by: PHYSICIAN ASSISTANT

## 2018-12-07 PROCEDURE — 99213 OFFICE O/P EST LOW 20 MIN: CPT | Performed by: PHYSICIAN ASSISTANT

## 2018-12-07 NOTE — PROGRESS NOTES
Subjective   Vashti Hair is a 43 y.o. female. Patient here today for medication check for hypertension, hypothyroid     History of Present Illness     Patient has been doing well without complaints. No AE to medication.     The following portions of the patient's history were reviewed and updated as appropriate: allergies, current medications, past family history, past medical history, past social history, past surgical history and problem list.    Review of Systems   All other systems reviewed and are negative.      Objective   Physical Exam   Constitutional: She is oriented to person, place, and time. She appears well-developed and well-nourished.   HENT:   Head: Normocephalic and atraumatic.   Right Ear: External ear normal.   Left Ear: External ear normal.   Nose: Nose normal.   Eyes: Conjunctivae and lids are normal.   Neck: Neck supple. Carotid bruit is not present.   Cardiovascular: Normal rate, regular rhythm, normal heart sounds and intact distal pulses. Exam reveals no gallop and no friction rub.   No murmur heard.  Pulmonary/Chest: Effort normal and breath sounds normal. No respiratory distress. She has no wheezes. She has no rhonchi. She has no rales.   Musculoskeletal: She exhibits no edema or deformity.   Neurological: She is alert and oriented to person, place, and time. Gait normal.   Skin: Skin is warm and dry.   Psychiatric: She has a normal mood and affect. Her speech is normal and behavior is normal. Judgment and thought content normal. Cognition and memory are normal.   Nursing note and vitals reviewed.      Assessment/Plan   Vashti was seen today for med management.    Diagnoses and all orders for this visit:    Benign hypertension  -     CBC & Differential  -     Comprehensive Metabolic Panel    Acquired hypothyroidism  -     CBC & Differential  -     Thyroid Panel With TSH    Vitamin D deficiency  -     CBC & Differential  -     Comprehensive Metabolic Panel  -     Vitamin D 25  Hydroxy    Other orders  -     Flucelvax Quad=>4Years ([id number])      Patient Instructions   43 YEAR OLD FEMALE WHO PRESENTS TODAY IN FOLLOW UP OF HTN, HYPOTHYROIDISM, AND VITAMIN D DEFICIENCY. SHE HAS BEEN DOING WELL ON MEDICATION. BP HAS BEEN WELL CONTROLLED AND NO AE WITH MEDICATION. SHE HAS NOTED LESS IRRITABILITY WITH SYNTHROID. BLOOD PRESSURE IS NORMAL TODAY- CONTINUE LISINOPRIL 10 MG ONCE DAILY. I WILL CHECK LABS TODAY- CALL IF NO RESULTS IN 1 WEEK. IF NORMAL, I WILL HAVE HER FOLLOW UP IN 6 MONTHS OR SOONER IF ABNORMAL LABS. TO CALL OR RETURN SOONER IF ANY CONCERNS, SYMPTOMS, OR ELEVATED BLOOD PRESSURE.

## 2018-12-08 LAB
25(OH)D3+25(OH)D2 SERPL-MCNC: 27 NG/ML (ref 30–100)
ALBUMIN SERPL-MCNC: 4.4 G/DL (ref 3.5–5.2)
ALBUMIN/GLOB SERPL: 1.8 G/DL
ALP SERPL-CCNC: 43 U/L (ref 39–117)
ALT SERPL-CCNC: 13 U/L (ref 1–33)
AST SERPL-CCNC: 15 U/L (ref 1–32)
BASOPHILS # BLD AUTO: 0.02 10*3/MM3 (ref 0–0.2)
BASOPHILS NFR BLD AUTO: 0.3 % (ref 0–1.5)
BILIRUB SERPL-MCNC: 0.4 MG/DL (ref 0.1–1.2)
BUN SERPL-MCNC: 15 MG/DL (ref 6–20)
BUN/CREAT SERPL: 19.5 (ref 7–25)
CALCIUM SERPL-MCNC: 9.5 MG/DL (ref 8.6–10.5)
CHLORIDE SERPL-SCNC: 104 MMOL/L (ref 98–107)
CO2 SERPL-SCNC: 22 MMOL/L (ref 22–29)
CREAT SERPL-MCNC: 0.77 MG/DL (ref 0.57–1)
EOSINOPHIL # BLD AUTO: 0.17 10*3/MM3 (ref 0–0.7)
EOSINOPHIL NFR BLD AUTO: 2.6 % (ref 0.3–6.2)
ERYTHROCYTE [DISTWIDTH] IN BLOOD BY AUTOMATED COUNT: 14.3 % (ref 11.7–13)
FT4I SERPL CALC-MCNC: 2.1 (ref 1.2–4.9)
GLOBULIN SER CALC-MCNC: 2.5 GM/DL
GLUCOSE SERPL-MCNC: 106 MG/DL (ref 65–99)
HCT VFR BLD AUTO: 43.5 % (ref 35.6–45.5)
HGB BLD-MCNC: 13.6 G/DL (ref 11.9–15.5)
IMM GRANULOCYTES # BLD: 0.03 10*3/MM3 (ref 0–0.03)
IMM GRANULOCYTES NFR BLD: 0.5 % (ref 0–0.5)
LYMPHOCYTES # BLD AUTO: 1.76 10*3/MM3 (ref 0.9–4.8)
LYMPHOCYTES NFR BLD AUTO: 26.7 % (ref 19.6–45.3)
MCH RBC QN AUTO: 29.6 PG (ref 26.9–32)
MCHC RBC AUTO-ENTMCNC: 31.3 G/DL (ref 32.4–36.3)
MCV RBC AUTO: 94.8 FL (ref 80.5–98.2)
MONOCYTES # BLD AUTO: 0.58 10*3/MM3 (ref 0.2–1.2)
MONOCYTES NFR BLD AUTO: 8.8 % (ref 5–12)
NEUTROPHILS # BLD AUTO: 4.02 10*3/MM3 (ref 1.9–8.1)
NEUTROPHILS NFR BLD AUTO: 61.1 % (ref 42.7–76)
PLATELET # BLD AUTO: 369 10*3/MM3 (ref 140–500)
POTASSIUM SERPL-SCNC: 4.6 MMOL/L (ref 3.5–5.2)
PROT SERPL-MCNC: 6.9 G/DL (ref 6–8.5)
RBC # BLD AUTO: 4.59 10*6/MM3 (ref 3.9–5.2)
SODIUM SERPL-SCNC: 142 MMOL/L (ref 136–145)
T3RU NFR SERPL: 27 % (ref 24–39)
T4 SERPL-MCNC: 7.9 UG/DL (ref 4.5–12)
TSH SERPL DL<=0.005 MIU/L-ACNC: 4.29 UIU/ML (ref 0.45–4.5)
WBC # BLD AUTO: 6.58 10*3/MM3 (ref 4.5–10.7)

## 2018-12-10 DIAGNOSIS — E03.9 HYPOTHYROIDISM, UNSPECIFIED TYPE: ICD-10-CM

## 2018-12-10 RX ORDER — LEVOTHYROXINE SODIUM 0.05 MG/1
50 TABLET ORAL DAILY
Qty: 30 TABLET | Refills: 1 | Status: SHIPPED | OUTPATIENT
Start: 2018-12-10 | End: 2019-02-15 | Stop reason: SDUPTHER

## 2018-12-12 ENCOUNTER — TELEPHONE (OUTPATIENT)
Dept: FAMILY MEDICINE CLINIC | Facility: CLINIC | Age: 43
End: 2018-12-12

## 2018-12-12 DIAGNOSIS — E03.9 ACQUIRED HYPOTHYROIDISM: Primary | ICD-10-CM

## 2018-12-13 NOTE — PATIENT INSTRUCTIONS
43 YEAR OLD FEMALE WHO PRESENTS TODAY IN FOLLOW UP OF HTN, HYPOTHYROIDISM, AND VITAMIN D DEFICIENCY. SHE HAS BEEN DOING WELL ON MEDICATION. BP HAS BEEN WELL CONTROLLED AND NO AE WITH MEDICATION. SHE HAS NOTED LESS IRRITABILITY WITH SYNTHROID. BLOOD PRESSURE IS NORMAL TODAY- CONTINUE LISINOPRIL 10 MG ONCE DAILY. I WILL CHECK LABS TODAY- CALL IF NO RESULTS IN 1 WEEK. IF NORMAL, I WILL HAVE HER FOLLOW UP IN 6 MONTHS OR SOONER IF ABNORMAL LABS. TO CALL OR RETURN SOONER IF ANY CONCERNS, SYMPTOMS, OR ELEVATED BLOOD PRESSURE.

## 2019-01-16 DIAGNOSIS — I10 BENIGN HYPERTENSION: ICD-10-CM

## 2019-01-16 RX ORDER — LISINOPRIL 10 MG/1
TABLET ORAL
Qty: 30 TABLET | Refills: 3 | Status: SHIPPED | OUTPATIENT
Start: 2019-01-16 | End: 2019-06-17 | Stop reason: SDUPTHER

## 2019-01-23 ENCOUNTER — RESULTS ENCOUNTER (OUTPATIENT)
Dept: FAMILY MEDICINE CLINIC | Facility: CLINIC | Age: 44
End: 2019-01-23

## 2019-01-23 DIAGNOSIS — E03.9 ACQUIRED HYPOTHYROIDISM: ICD-10-CM

## 2019-02-14 LAB
FT4I SERPL CALC-MCNC: 2.6 (ref 1.2–4.9)
T3RU NFR SERPL: 29 % (ref 24–39)
T4 SERPL-MCNC: 9.1 UG/DL (ref 4.5–12)
TSH SERPL DL<=0.005 MIU/L-ACNC: 5.69 UIU/ML (ref 0.45–4.5)

## 2019-02-15 DIAGNOSIS — E03.9 HYPOTHYROIDISM, UNSPECIFIED TYPE: ICD-10-CM

## 2019-02-15 RX ORDER — LEVOTHYROXINE SODIUM 0.07 MG/1
75 TABLET ORAL DAILY
Qty: 30 TABLET | Refills: 1 | Status: SHIPPED | OUTPATIENT
Start: 2019-02-15 | End: 2019-05-03 | Stop reason: SDUPTHER

## 2019-04-30 ENCOUNTER — TELEPHONE (OUTPATIENT)
Dept: FAMILY MEDICINE CLINIC | Facility: CLINIC | Age: 44
End: 2019-04-30

## 2019-04-30 DIAGNOSIS — E03.9 ACQUIRED HYPOTHYROIDISM: Primary | ICD-10-CM

## 2019-04-30 NOTE — TELEPHONE ENCOUNTER
----- Message from Sushma Marks sent at 4/30/2019 10:13 AM EDT -----  Regarding: THYROID LABS.  PATIENT IS COMING IN FOR HER THYROID LEVEL RE-CHECK AND WILL NEED LAB ORDERS PLACED

## 2019-05-03 DIAGNOSIS — E03.9 HYPOTHYROIDISM, UNSPECIFIED TYPE: ICD-10-CM

## 2019-05-03 LAB
FT4I SERPL CALC-MCNC: 2.9 (ref 1.2–4.9)
T3RU NFR SERPL: 29 % (ref 24–39)
T4 SERPL-MCNC: 9.9 UG/DL (ref 4.5–12)
TSH SERPL DL<=0.005 MIU/L-ACNC: 2.49 UIU/ML (ref 0.45–4.5)

## 2019-05-03 RX ORDER — LEVOTHYROXINE SODIUM 0.07 MG/1
75 TABLET ORAL DAILY
Qty: 30 TABLET | Refills: 2 | Status: SHIPPED | OUTPATIENT
Start: 2019-05-03 | End: 2019-08-15 | Stop reason: SDUPTHER

## 2019-06-07 ENCOUNTER — OFFICE VISIT (OUTPATIENT)
Dept: FAMILY MEDICINE CLINIC | Facility: CLINIC | Age: 44
End: 2019-06-07

## 2019-06-07 VITALS
BODY MASS INDEX: 29.45 KG/M2 | RESPIRATION RATE: 16 BRPM | WEIGHT: 156 LBS | HEIGHT: 61 IN | OXYGEN SATURATION: 98 % | DIASTOLIC BLOOD PRESSURE: 74 MMHG | SYSTOLIC BLOOD PRESSURE: 102 MMHG | HEART RATE: 73 BPM | TEMPERATURE: 98.1 F

## 2019-06-07 DIAGNOSIS — F41.9 ANXIETY: ICD-10-CM

## 2019-06-07 DIAGNOSIS — N92.6 IRREGULAR MENSTRUAL BLEEDING: ICD-10-CM

## 2019-06-07 DIAGNOSIS — E03.9 ACQUIRED HYPOTHYROIDISM: ICD-10-CM

## 2019-06-07 DIAGNOSIS — I10 ESSENTIAL HYPERTENSION: Primary | ICD-10-CM

## 2019-06-07 PROCEDURE — 99214 OFFICE O/P EST MOD 30 MIN: CPT | Performed by: PHYSICIAN ASSISTANT

## 2019-06-07 NOTE — PROGRESS NOTES
"Subjective   Vashti Hair is a 44 y.o. female present today for medication management for hypothyroidism and hypertension.     History of Present Illness     Reports for months, when laying in bed at night, heart is \"pounding\". Repots heart feels like it is beating hard but not fast. States has been having more anxiety. She hasn't noticed the heart pounding or anxiety when school is out. Not every night- not consistent. Was occurring several times a week but has not happened since finishing school 5/21/19.  Patient reports she has had increased crying, anxiety, and being more emotional.  This is uncharacteristic for her.  She reports outwardly she appears okay, however, inside feels like her emotions are out of control.  Patient reports her sister went through menopause in her 30s and was completely menopausal at 42 and her mother went through menopause in her 40s as well.  Patient reports being late for menses this month.  She reports that is very abnormal for her.  Patient is concerned that she could be perimenopausal.  She had an appointment with her OB/GYN in January and they canceled and rescheduled her until March then canceled and rescheduled her again until June.  Patient had appointment earlier this week but started menses the day before her appointment.  They rescheduled her again until the end of July.  She will keep follow-up with them but is concerned today.    The following portions of the patient's history were reviewed and updated as appropriate: allergies, current medications, past family history, past medical history, past social history, past surgical history and problem list.    Review of Systems   Cardiovascular:        Heart pounding hard   Genitourinary: Positive for menstrual problem.   Psychiatric/Behavioral: The patient is nervous/anxious ( And crying).    All other systems reviewed and are negative.      Objective   Physical Exam   Constitutional: She is oriented to person, place, and " time. She appears well-developed and well-nourished.   HENT:   Head: Normocephalic and atraumatic.   Right Ear: External ear normal.   Left Ear: External ear normal.   Nose: Nose normal.   Eyes: Conjunctivae and lids are normal.   Neck: Neck supple. Carotid bruit is not present.   Cardiovascular: Normal rate, regular rhythm, normal heart sounds and intact distal pulses. Exam reveals no gallop and no friction rub.   No murmur heard.  Pulmonary/Chest: Effort normal and breath sounds normal. No respiratory distress. She has no wheezes. She has no rhonchi. She has no rales.   Musculoskeletal: She exhibits no edema or deformity.   Neurological: She is alert and oriented to person, place, and time. Gait normal.   Skin: Skin is warm and dry.   Psychiatric: She has a normal mood and affect. Her speech is normal and behavior is normal. Judgment and thought content normal. Cognition and memory are normal.   Nursing note and vitals reviewed.      Assessment/Plan   Vashti was seen today for med management.    Diagnoses and all orders for this visit:    Essential hypertension  -     CBC & Differential  -     Comprehensive Metabolic Panel  -     Lipid Panel With LDL / HDL Ratio  -     Vitamin D 25 Hydroxy  -     Cancel: POC Urinalysis Dipstick, Automated  -     Thyroid Panel With TSH  -     DHEA  -     Estradiol  -     Follicle Stimulating Hormone  -     Luteinizing Hormone  -     Progesterone  -     Testosterone, Total, Women & Children    Acquired hypothyroidism  -     CBC & Differential  -     Comprehensive Metabolic Panel  -     Lipid Panel With LDL / HDL Ratio  -     Vitamin D 25 Hydroxy  -     Cancel: POC Urinalysis Dipstick, Automated  -     Thyroid Panel With TSH  -     DHEA  -     Estradiol  -     Follicle Stimulating Hormone  -     Luteinizing Hormone  -     Progesterone  -     Testosterone, Total, Women & Children    Irregular menstrual bleeding  -     CBC & Differential  -     Comprehensive Metabolic Panel  -      Lipid Panel With LDL / HDL Ratio  -     Vitamin D 25 Hydroxy  -     Cancel: POC Urinalysis Dipstick, Automated  -     Thyroid Panel With TSH  -     DHEA  -     Estradiol  -     Follicle Stimulating Hormone  -     Luteinizing Hormone  -     Progesterone  -     Testosterone, Total, Women & Children    Anxiety  -     CBC & Differential  -     Comprehensive Metabolic Panel  -     Lipid Panel With LDL / HDL Ratio  -     Vitamin D 25 Hydroxy  -     Cancel: POC Urinalysis Dipstick, Automated  -     Thyroid Panel With TSH  -     DHEA  -     Estradiol  -     Follicle Stimulating Hormone  -     Luteinizing Hormone  -     Progesterone  -     Testosterone, Total, Women & Children      Patient Instructions   44-year-old female who presents today in follow-up of hypertension and hypothyroidism.  Blood pressure today is low.  She should continue to monitor her blood pressure and vary the time, morning, afternoon, and evening.  Patient to call if elevated or low blood pressures.  She denies dizziness.  However, she does have heart pounding intermittent at night when she lays down.  Patient reports her heart does not feel like it is beating fast but is beating hard.  This was occurring several times per week but has not occurred since school ended for the summer.  I will have her monitor symptoms and call if persistent symptoms.  She should also check her pulse when this occurs.  We will check fasting labs today as well.  Last TSH was controlled, however, we will want to ensure no changes.    Patient has had mood changes with anxiety and crying and feeling unlike herself.  She does not typically have mood changes and is pretty calm.  She also started her period late which is very unusual as well.   with vasectomy.  She reports her sister went through menopause in her 30s and was menopausal at 42 and mother went through menopause in her 40s.  She is concerned she is perimenopausal.  We will check hormones and labs today.  I  discussed with her normal labs in perimenopause.  If labs are normal today, we will consider Celexa 10 mg at bedtime.

## 2019-06-07 NOTE — PATIENT INSTRUCTIONS
44-year-old female who presents today in follow-up of hypertension and hypothyroidism.  Blood pressure today is low.  She should continue to monitor her blood pressure and vary the time, morning, afternoon, and evening.  Patient to call if elevated or low blood pressures.  She denies dizziness.  However, she does have heart pounding intermittent at night when she lays down.  Patient reports her heart does not feel like it is beating fast but is beating hard.  This was occurring several times per week but has not occurred since school ended for the summer.  I will have her monitor symptoms and call if persistent symptoms.  She should also check her pulse when this occurs.  We will check fasting labs today as well.  Last TSH was controlled, however, we will want to ensure no changes.    Patient has had mood changes with anxiety and crying and feeling unlike herself.  She does not typically have mood changes and is pretty calm.  She also started her period late which is very unusual as well.   with vasectomy.  She reports her sister went through menopause in her 30s and was menopausal at 42 and mother went through menopause in her 40s.  She is concerned she is perimenopausal.  We will check hormones and labs today.  I discussed with her normal labs in perimenopause.  If labs are normal today, we will consider Celexa 10 mg at bedtime.

## 2019-06-12 LAB
25(OH)D3+25(OH)D2 SERPL-MCNC: 23.2 NG/ML (ref 30–100)
ALBUMIN SERPL-MCNC: 4.2 G/DL (ref 3.5–5.2)
ALBUMIN/GLOB SERPL: 1.4 G/DL
ALP SERPL-CCNC: 49 U/L (ref 39–117)
ALT SERPL-CCNC: 13 U/L (ref 1–33)
AST SERPL-CCNC: 15 U/L (ref 1–32)
BASOPHILS # BLD AUTO: 0.05 10*3/MM3 (ref 0–0.2)
BASOPHILS NFR BLD AUTO: 0.8 % (ref 0–1.5)
BILIRUB SERPL-MCNC: 0.4 MG/DL (ref 0.2–1.2)
BUN SERPL-MCNC: 12 MG/DL (ref 6–20)
BUN/CREAT SERPL: 14.6 (ref 7–25)
CALCIUM SERPL-MCNC: 9.8 MG/DL (ref 8.6–10.5)
CHLORIDE SERPL-SCNC: 103 MMOL/L (ref 98–107)
CHOLEST SERPL-MCNC: 169 MG/DL (ref 0–200)
CO2 SERPL-SCNC: 24.3 MMOL/L (ref 22–29)
CREAT SERPL-MCNC: 0.82 MG/DL (ref 0.57–1)
DHEA SERPL-MCNC: 268 NG/DL (ref 31–701)
EOSINOPHIL # BLD AUTO: 0.18 10*3/MM3 (ref 0–0.4)
EOSINOPHIL NFR BLD AUTO: 2.8 % (ref 0.3–6.2)
ERYTHROCYTE [DISTWIDTH] IN BLOOD BY AUTOMATED COUNT: 13.2 % (ref 12.3–15.4)
ESTRADIOL SERPL-MCNC: 15.6 PG/ML
FSH SERPL-ACNC: 20.9 MIU/ML
FT4I SERPL CALC-MCNC: 2.9 (ref 1.2–4.9)
GLOBULIN SER CALC-MCNC: 2.9 GM/DL
GLUCOSE SERPL-MCNC: 97 MG/DL (ref 65–99)
HCT VFR BLD AUTO: 44.2 % (ref 34–46.6)
HDLC SERPL-MCNC: 46 MG/DL (ref 40–60)
HGB BLD-MCNC: 13.8 G/DL (ref 12–15.9)
IMM GRANULOCYTES # BLD AUTO: 0.02 10*3/MM3 (ref 0–0.05)
IMM GRANULOCYTES NFR BLD AUTO: 0.3 % (ref 0–0.5)
LDLC SERPL CALC-MCNC: 100 MG/DL (ref 0–100)
LDLC/HDLC SERPL: 2.17 {RATIO}
LH SERPL-ACNC: 7.3 MIU/ML
LYMPHOCYTES # BLD AUTO: 1.8 10*3/MM3 (ref 0.7–3.1)
LYMPHOCYTES NFR BLD AUTO: 27.8 % (ref 19.6–45.3)
MCH RBC QN AUTO: 29.1 PG (ref 26.6–33)
MCHC RBC AUTO-ENTMCNC: 31.2 G/DL (ref 31.5–35.7)
MCV RBC AUTO: 93.1 FL (ref 79–97)
MONOCYTES # BLD AUTO: 0.47 10*3/MM3 (ref 0.1–0.9)
MONOCYTES NFR BLD AUTO: 7.3 % (ref 5–12)
NEUTROPHILS # BLD AUTO: 3.96 10*3/MM3 (ref 1.7–7)
NEUTROPHILS NFR BLD AUTO: 61 % (ref 42.7–76)
NRBC BLD AUTO-RTO: 0 /100 WBC (ref 0–0.2)
PLATELET # BLD AUTO: 387 10*3/MM3 (ref 140–450)
POTASSIUM SERPL-SCNC: 4.3 MMOL/L (ref 3.5–5.2)
PROGEST SERPL-MCNC: 0.3 NG/ML
PROT SERPL-MCNC: 7.1 G/DL (ref 6–8.5)
RBC # BLD AUTO: 4.75 10*6/MM3 (ref 3.77–5.28)
SODIUM SERPL-SCNC: 141 MMOL/L (ref 136–145)
T3RU NFR SERPL: 29 % (ref 24–39)
T4 SERPL-MCNC: 10.1 UG/DL (ref 4.5–12)
TESTOST SERPL-MCNC: 14 NG/DL
TRIGL SERPL-MCNC: 115 MG/DL (ref 0–150)
TSH SERPL DL<=0.005 MIU/L-ACNC: 2.22 UIU/ML (ref 0.45–4.5)
VLDLC SERPL CALC-MCNC: 23 MG/DL (ref 5–40)
WBC # BLD AUTO: 6.48 10*3/MM3 (ref 3.4–10.8)

## 2019-06-17 DIAGNOSIS — I10 BENIGN HYPERTENSION: ICD-10-CM

## 2019-06-17 RX ORDER — LISINOPRIL 10 MG/1
TABLET ORAL
Qty: 30 TABLET | Refills: 3 | Status: SHIPPED | OUTPATIENT
Start: 2019-06-17 | End: 2019-10-28 | Stop reason: SDUPTHER

## 2019-06-19 RX ORDER — CITALOPRAM 10 MG/1
10 TABLET ORAL NIGHTLY
Qty: 30 TABLET | Refills: 1 | Status: SHIPPED | OUTPATIENT
Start: 2019-06-19 | End: 2019-12-13

## 2019-06-20 ENCOUNTER — TELEPHONE (OUTPATIENT)
Dept: FAMILY MEDICINE CLINIC | Facility: CLINIC | Age: 44
End: 2019-06-20

## 2019-08-15 DIAGNOSIS — E03.9 HYPOTHYROIDISM, UNSPECIFIED TYPE: ICD-10-CM

## 2019-08-15 RX ORDER — LEVOTHYROXINE SODIUM 0.07 MG/1
TABLET ORAL
Qty: 30 TABLET | Refills: 2 | Status: SHIPPED | OUTPATIENT
Start: 2019-08-15 | End: 2019-11-27 | Stop reason: SDUPTHER

## 2019-10-28 DIAGNOSIS — I10 BENIGN HYPERTENSION: ICD-10-CM

## 2019-10-28 RX ORDER — LISINOPRIL 10 MG/1
TABLET ORAL
Qty: 30 TABLET | Refills: 3 | Status: SHIPPED | OUTPATIENT
Start: 2019-10-28 | End: 2020-02-10

## 2019-11-27 DIAGNOSIS — E03.9 HYPOTHYROIDISM, UNSPECIFIED TYPE: ICD-10-CM

## 2019-11-27 RX ORDER — LEVOTHYROXINE SODIUM 0.07 MG/1
TABLET ORAL
Qty: 30 TABLET | Refills: 0 | Status: SHIPPED | OUTPATIENT
Start: 2019-11-27 | End: 2019-12-23 | Stop reason: SDUPTHER

## 2019-12-13 ENCOUNTER — OFFICE VISIT (OUTPATIENT)
Dept: FAMILY MEDICINE CLINIC | Facility: CLINIC | Age: 44
End: 2019-12-13

## 2019-12-13 VITALS
TEMPERATURE: 98.6 F | BODY MASS INDEX: 31.49 KG/M2 | WEIGHT: 166.8 LBS | SYSTOLIC BLOOD PRESSURE: 104 MMHG | OXYGEN SATURATION: 99 % | HEART RATE: 80 BPM | DIASTOLIC BLOOD PRESSURE: 70 MMHG | HEIGHT: 61 IN

## 2019-12-13 DIAGNOSIS — E55.9 VITAMIN D DEFICIENCY: ICD-10-CM

## 2019-12-13 DIAGNOSIS — F41.9 ANXIETY: ICD-10-CM

## 2019-12-13 DIAGNOSIS — I10 BENIGN HYPERTENSION: Primary | ICD-10-CM

## 2019-12-13 DIAGNOSIS — E03.9 ACQUIRED HYPOTHYROIDISM: ICD-10-CM

## 2019-12-13 PROCEDURE — 99214 OFFICE O/P EST MOD 30 MIN: CPT | Performed by: PHYSICIAN ASSISTANT

## 2019-12-13 NOTE — PATIENT INSTRUCTIONS
44-year-old female who presents today in follow-up of hypertension, hypothyroidism, vitamin D deficiency, and moods.  Blood pressure today remains a little low.  She continues to monitor her blood pressure, and per patient, readings have been normal.  She has not had any low readings at home and had slightly elevated reading at her OB/GYN recently.  Patient to call if elevated or low blood pressures.  She denies dizziness or any signs of orthostasis or hypotension.  For now, continue lisinopril 10 mg once daily.  She previously complained of her heart pounding intermittent at night several times per week while in school but had not occurred since school ended for the summer.  Patient reported her heart did not feel like it was beating fast but was beating hard. She was advised that she should check her pulse when this occurred she is no longer.    Complaining of the symptoms.  She has no other thyroid complaints and is tolerating Synthroid without AE. Last TSH was controlled, however, we will want to ensure no changes.  She forgot to take vitamin D and is not using any supplement at this time. Patient will have fasting labs. Call if no results in 1 week. Stability of conditions, plan, follow up, and further recommendations pending labs.  If stable labs, stable blood pressure, and no concerns, follow-up in 6 months, fasting with me.      At previous visit, patient also reported some mood changes with anxiety and crying and feeling unlike herself.  She does not typically have mood changes and is pretty calm, so this was abnormal for her.  She also started her period late which is very unusual as well.    We checked labs which were okay and she wanted to start something low-dose for her moods.  She was given Celexa 10 mg at bedtime.  Patient reports she picked up the medication but never took it.  She has worked on increasing exercise, mindfulness, and has settled into her new job and her daughter has settled into  college.  She feels this is helping her anxiety.  Patient does not feel she needs medication for this at this time.  If she has worsening moods, she can always start Celexa and follow-up with me in 1 month after starting medication.  Patient verbalized understanding and agreement with recommendations and plan.

## 2019-12-13 NOTE — PROGRESS NOTES
Subjective   Vashti Hair is a 44 y.o. female here today for medication management for hypertension, hypothyroid    History of Present Illness     Hypertension- BP at OBGYN 130/90 but has been controlled but not low with mother's cuff.  She has no dizziness with standing and no signs of hypertension.  Thyroid- no concerns. Taking medication correctly. States when she is on break, it is more difficult to remember at the same time.   Vitamin D- forgets to take  Moods- hasn't taken the Celexa. Picked it up and has not needed. States she has exercised more and mindfulness. Thinks it is working. Has bad days but definitely more good days than bad.     The following portions of the patient's history were reviewed and updated as appropriate: allergies, current medications, past family history, past medical history, past social history, past surgical history and problem list.    Review of Systems   Constitutional: Negative.    HENT: Negative.    Eyes: Negative.    Respiratory: Negative.    Cardiovascular: Negative.    Gastrointestinal: Negative.    Endocrine: Negative.    Genitourinary: Negative.    Musculoskeletal: Negative.    Skin: Negative.    Neurological: Negative.    Hematological: Negative.    Psychiatric/Behavioral: Negative.        Objective    Vitals:    12/13/19 0655   BP: 104/70   Pulse: 80   Temp: 98.6 °F (37 °C)   SpO2: 99%     Body mass index is 31.53 kg/m².    Physical Exam   Constitutional: She is oriented to person, place, and time. She appears well-developed and well-nourished.   HENT:   Head: Normocephalic and atraumatic.   Right Ear: External ear normal.   Left Ear: External ear normal.   Nose: Nose normal.   Eyes: Conjunctivae and lids are normal.   Neck: Neck supple. Carotid bruit is not present.   Cardiovascular: Normal rate, regular rhythm, normal heart sounds and intact distal pulses. Exam reveals no gallop and no friction rub.   No murmur heard.  Pulmonary/Chest: Effort normal and breath sounds  normal. No respiratory distress. She has no wheezes. She has no rhonchi. She has no rales.   Musculoskeletal: She exhibits no edema or deformity.   Neurological: She is alert and oriented to person, place, and time. Gait normal.   Skin: Skin is warm and dry.   Psychiatric: She has a normal mood and affect. Her speech is normal and behavior is normal. Judgment and thought content normal. Cognition and memory are normal.   Nursing note and vitals reviewed.      Assessment/Plan   Vashti was seen today for med management.    Diagnoses and all orders for this visit:    Benign hypertension  -     Comprehensive Metabolic Panel    Acquired hypothyroidism  -     TSH  -     T4, free  -     T3, Free    Vitamin D deficiency  -     Comprehensive Metabolic Panel  -     Vitamin D 25 Hydroxy    Anxiety      Patient Instructions   44-year-old female who presents today in follow-up of hypertension, hypothyroidism, vitamin D deficiency, and moods.  Blood pressure today remains a little low.  She  continues to monitor her blood pressure, and per patient, readings have been normal.  She has not had any low readings at home and had slightly elevated reading at her OB/GYN recently.  Patient to call if elevated or low blood pressures.  She denies dizziness or any signs of orthostasis or hypotension.  For now, continue lisinopril 10 mg once daily.  She previously complained of her heart pounding intermittent at night several times per week while in school but had not occurred since school ended for the summer.  Patient reported her heart did not feel like it was beating fast but was beating hard. She  was advised that she should check her pulse when this occurred she is no longer.     Complaining of the symptoms.  She has no other thyroid complaints and is tolerating Synthroid without AE. Last TSH was controlled, however, we will want to ensure no changes.  She forgot to take vitamin D and is not using any supplement at this time. Patient  will have fasting labs. Call if no results in 1 week. Stability of conditions, plan, follow up, and further recommendations pending labs.  If stable labs, stable blood pressure, and no concerns, follow-up in 6 months, fasting with me.      At previous visit, patient also reported some mood changes with anxiety and crying and feeling unlike herself.  She does not typically have mood changes and is pretty calm, so this was abnormal for her.  She also started her period late which is very unusual as well.     We checked labs which were okay and she wanted to start something low-dose for her moods.  She was given Celexa 10 mg at bedtime.  Patient reports she picked up the medication but never took it.  She has worked on increasing exercise, mindfulness, and has settled into her new job and her daughter has settled into college.  She feels this is helping her anxiety.  Patient does not feel she needs medication for this at this time.  If she has worsening moods, she can always start Celexa and follow-up with me in 1 month after starting medication.  Patient verbalized understanding and agreement with recommendations and plan.

## 2019-12-14 LAB
25(OH)D3+25(OH)D2 SERPL-MCNC: 22.3 NG/ML (ref 30–100)
ALBUMIN SERPL-MCNC: 4.4 G/DL (ref 3.5–5.2)
ALBUMIN/GLOB SERPL: 1.6 G/DL
ALP SERPL-CCNC: 48 U/L (ref 39–117)
ALT SERPL-CCNC: 15 U/L (ref 1–33)
AST SERPL-CCNC: 15 U/L (ref 1–32)
BILIRUB SERPL-MCNC: 0.3 MG/DL (ref 0.2–1.2)
BUN SERPL-MCNC: 11 MG/DL (ref 6–20)
BUN/CREAT SERPL: 12.6 (ref 7–25)
CALCIUM SERPL-MCNC: 9.1 MG/DL (ref 8.6–10.5)
CHLORIDE SERPL-SCNC: 103 MMOL/L (ref 98–107)
CO2 SERPL-SCNC: 27.8 MMOL/L (ref 22–29)
CREAT SERPL-MCNC: 0.87 MG/DL (ref 0.57–1)
GLOBULIN SER CALC-MCNC: 2.8 GM/DL
GLUCOSE SERPL-MCNC: 96 MG/DL (ref 65–99)
POTASSIUM SERPL-SCNC: 4.3 MMOL/L (ref 3.5–5.2)
PROT SERPL-MCNC: 7.2 G/DL (ref 6–8.5)
SODIUM SERPL-SCNC: 142 MMOL/L (ref 136–145)
T3FREE SERPL-MCNC: 3.7 PG/ML (ref 2–4.4)
T4 FREE SERPL-MCNC: 1.6 NG/DL (ref 0.93–1.7)
TSH SERPL DL<=0.005 MIU/L-ACNC: 4.45 UIU/ML (ref 0.27–4.2)

## 2019-12-23 DIAGNOSIS — E03.9 HYPOTHYROIDISM, UNSPECIFIED TYPE: ICD-10-CM

## 2019-12-23 RX ORDER — LEVOTHYROXINE SODIUM 88 UG/1
88 TABLET ORAL DAILY
Qty: 30 TABLET | Refills: 1 | Status: SHIPPED | OUTPATIENT
Start: 2019-12-23 | End: 2020-02-28

## 2020-02-04 DIAGNOSIS — E03.9 ACQUIRED HYPOTHYROIDISM: Primary | ICD-10-CM

## 2020-02-04 RX ORDER — CITALOPRAM 10 MG/1
10 TABLET ORAL DAILY
Start: 2020-02-04 | End: 2020-03-12

## 2020-02-05 LAB
T3FREE SERPL-MCNC: 3.2 PG/ML (ref 2–4.4)
T4 FREE SERPL-MCNC: 1.68 NG/DL (ref 0.93–1.7)
TSH SERPL DL<=0.005 MIU/L-ACNC: 1.51 UIU/ML (ref 0.27–4.2)

## 2020-02-10 DIAGNOSIS — I10 BENIGN HYPERTENSION: ICD-10-CM

## 2020-02-10 RX ORDER — LISINOPRIL 10 MG/1
TABLET ORAL
Qty: 30 TABLET | Refills: 2 | Status: SHIPPED | OUTPATIENT
Start: 2020-02-10 | End: 2020-04-24 | Stop reason: SDUPTHER

## 2020-02-28 DIAGNOSIS — E03.9 HYPOTHYROIDISM, UNSPECIFIED TYPE: ICD-10-CM

## 2020-02-28 RX ORDER — LEVOTHYROXINE SODIUM 88 UG/1
88 TABLET ORAL DAILY
Qty: 30 TABLET | Refills: 1 | Status: SHIPPED | OUTPATIENT
Start: 2020-02-28 | End: 2020-04-24 | Stop reason: SDUPTHER

## 2020-03-12 RX ORDER — CITALOPRAM 10 MG/1
TABLET ORAL
Qty: 30 TABLET | Refills: 1 | Status: SHIPPED | OUTPATIENT
Start: 2020-03-12 | End: 2020-04-24 | Stop reason: SDUPTHER

## 2020-04-23 PROBLEM — I10 BENIGN HYPERTENSION: Status: ACTIVE | Noted: 2020-04-23

## 2020-04-23 PROBLEM — E03.9 ACQUIRED HYPOTHYROIDISM: Status: ACTIVE | Noted: 2020-04-23

## 2020-04-23 PROBLEM — F41.9 ANXIETY: Status: ACTIVE | Noted: 2020-04-23

## 2020-04-23 PROBLEM — R20.2 TINGLING: Status: RESOLVED | Noted: 2018-02-23 | Resolved: 2020-04-23

## 2020-04-23 PROBLEM — E55.9 VITAMIN D DEFICIENCY: Status: ACTIVE | Noted: 2020-04-23

## 2020-04-24 ENCOUNTER — TELEMEDICINE (OUTPATIENT)
Dept: FAMILY MEDICINE CLINIC | Facility: CLINIC | Age: 45
End: 2020-04-24

## 2020-04-24 DIAGNOSIS — F41.9 ANXIETY: ICD-10-CM

## 2020-04-24 DIAGNOSIS — E03.9 ACQUIRED HYPOTHYROIDISM: ICD-10-CM

## 2020-04-24 DIAGNOSIS — E03.9 HYPOTHYROIDISM, UNSPECIFIED TYPE: ICD-10-CM

## 2020-04-24 DIAGNOSIS — E55.9 VITAMIN D DEFICIENCY: ICD-10-CM

## 2020-04-24 DIAGNOSIS — I10 BENIGN HYPERTENSION: Primary | ICD-10-CM

## 2020-04-24 PROCEDURE — 99214 OFFICE O/P EST MOD 30 MIN: CPT | Performed by: PHYSICIAN ASSISTANT

## 2020-04-24 RX ORDER — LISINOPRIL 10 MG/1
10 TABLET ORAL DAILY
Qty: 30 TABLET | Refills: 6 | Status: SHIPPED | OUTPATIENT
Start: 2020-04-24 | End: 2021-01-08 | Stop reason: SDUPTHER

## 2020-04-24 RX ORDER — CITALOPRAM 10 MG/1
10 TABLET ORAL NIGHTLY
Qty: 30 TABLET | Refills: 6 | Status: SHIPPED | OUTPATIENT
Start: 2020-04-24 | End: 2021-01-08 | Stop reason: SDUPTHER

## 2020-04-24 RX ORDER — LEVOTHYROXINE SODIUM 88 UG/1
88 TABLET ORAL DAILY
Qty: 30 TABLET | Refills: 6 | Status: SHIPPED | OUTPATIENT
Start: 2020-04-24 | End: 2021-01-08 | Stop reason: SDUPTHER

## 2020-04-24 NOTE — PATIENT INSTRUCTIONS
Try to change lisinopril 10 mg once daily to 1/2 tablet twice daily and monitor blood pressures. Call in 1-2 weeks with readings to see if this keeps blood pressure more stable throughout the day.  Patient to let me know if she has any AE with Celexa, worsening moods, or feels she needs a dosage change. Otherwise, we will re-evaluate at follow up.   Patient will need to have fasting labs.  She should call the office in 1 to 2 weeks or send message to find out when we are able to draw blood in office while playing COVID 19 pandemic restrictions.  Once labs are drawn, call if no results in 1 week. Stability of conditions, plan, follow up, and further recommendations pending labs.  If stable labs, stable blood pressure, and no concerns, follow-up in 6 months, fasting with me.

## 2020-04-24 NOTE — PROGRESS NOTES
Subjective   Vashti Hair is a 45 y.o. female who is being evaluated by video visit today in follow-up of hypertension, hypothyroidism, vitamin D deficiency, and moods.    History of Present Illness   You have chosen to receive care through a telehealth visit.  Do you consent to use a video/audio connection for your medical care today? Yes    Hypertension- BP at OBGYN 130/90 in the afternoon and was low in office 12/2019 in the morning .  She has no dizziness with standing and no signs of hypertension. She has not checked in a few weeks. When she has checked, it is lower in the morning- then in the afternoon is a little higher- 105/70 and afternoon 130/80.   Thyroid- Taking Synthroid 88 mcg daily as directed. States when she is on break, it is more difficult to remember at the same time.no signs of hyperthyroidism or hypothyroidism   Vitamin D- forgets to take  Moods- was having bad days and good days. She did not start medication initially but started and has been helpful. No AE with medication. Reports the 1st few days, she was dizzy- never came back and has been ok. She was 4 days in and had dizziness for 1 day then resolved.     The following portions of the patient's history were reviewed and updated as appropriate: allergies, current medications, past family history, past medical history, past social history, past surgical history and problem list.    Review of Systems   Constitutional: Negative.    HENT: Negative.    Respiratory: Negative.    Cardiovascular: Negative.    Endocrine: Negative.    Neurological: Positive for dizziness.   Psychiatric/Behavioral: Negative.        Objective      Physical Exam   Constitutional: She appears well-developed and well-nourished. No distress.   HENT:   Head: Normocephalic and atraumatic.   Eyes: Right eye exhibits no discharge. Left eye exhibits no discharge.   Pulmonary/Chest: Effort normal. No respiratory distress.   Skin: Skin is dry.   Psychiatric: She has a normal  mood and affect. Her speech is normal and behavior is normal. Judgment and thought content normal. Cognition and memory are normal. She is attentive.       Assessment/Plan   Diagnoses and all orders for this visit:    Benign hypertension  -     Comprehensive Metabolic Panel  -     lisinopril (PRINIVIL,ZESTRIL) 10 MG tablet; Take 1 tablet by mouth Daily.    Acquired hypothyroidism  -     CBC & Differential  -     Comprehensive Metabolic Panel  -     TSH  -     T4, free  -     T3, Free    Vitamin D deficiency  -     Comprehensive Metabolic Panel  -     Vitamin D 25 Hydroxy    Anxiety  -     TSH  -     T4, free  -     T3, Free    Hypothyroidism, unspecified type  -     levothyroxine (SYNTHROID, LEVOTHROID) 88 MCG tablet; Take 1 tablet by mouth Daily.    Other orders  -     citalopram (CeleXA) 10 MG tablet; Take 1 tablet by mouth Every Night.    Assessment and Plan  45-year-old female who is being evaluated by video visit today in follow-up of hypertension, hypothyroidism, vitamin D deficiency, and moods.  Blood pressure BP is lower in the morning and a little higher in the afternoon- averaging 105/70 in the morning and 130/80 in the afternoon. Previous blood pressure at her OBGYN was 130/90 in the afternoon and was low in office 12/2019 in the morning.  She has no dizziness with standing and no signs of hypotension. Try to change lisinopril 10 mg once daily to 1/2 tablet twice daily and monitor blood pressures. Call in 1-2 weeks with readings to see if this keeps blood pressure more stable throughout the day.  She previously complained of her heart pounding hard (not fast) intermittent at night several times per week while in school but this resolved when school ended for the summer. She continues on Synthroid 88 mcg daily and is doing well without signs of hyperthyroidism or hypothyroidism.  She forgets to take vitamin D and is not using any supplement at this time. Patient will need to have fasting labs.  She should  call the office in 1 to 2 weeks or send message to find out when we are able to draw blood in office while playing COVID 19 pandemic restrictions.  Once labs are drawn, call if no results in 1 week. Stability of conditions, plan, follow up, and further recommendations pending labs.  If stable labs, stable blood pressure, and no concerns, follow-up in 6 months, fasting with me.      At previous visit, patient reported some mood changes with anxiety, crying, and feeling unlike herself.  She does not typically have mood changes and is pretty calm, so this was abnormal for her.  She also started her period late which is very unusual as well. She was given Celexa 10 mg at bedtime.  Patient initially picked up the medication but did not start it.  She worked on increasing exercise, mindfulness, and has settling into her new job and her daughter has settled into college.  She did start Celexa 10 mg and has had improvement in moods, reporting she is glad she started it. She had 1 episode of dizziness on day 4 of medication. She stopped the medication x 1 day then resumed and has not had any recurrence or AE. She feels this is the correct dose and she would like to continue medication. I will refill today. Patient to let me know if she has any AE with medication, worsening moods, or feels she needs a dosage change. Otherwise, we will re-evaluate at follow up.   About 25 minutes spent reviewing the patient's chart and video visit, medical decision-making, and treatment plan.

## 2020-05-28 LAB
25(OH)D3+25(OH)D2 SERPL-MCNC: 21.3 NG/ML (ref 30–100)
ALBUMIN SERPL-MCNC: 4.1 G/DL (ref 3.5–5.2)
ALBUMIN/GLOB SERPL: 1.5 G/DL
ALP SERPL-CCNC: 45 U/L (ref 39–117)
ALT SERPL-CCNC: 15 U/L (ref 1–33)
AST SERPL-CCNC: 14 U/L (ref 1–32)
BASOPHILS # BLD AUTO: 0.04 10*3/MM3 (ref 0–0.2)
BASOPHILS NFR BLD AUTO: 0.7 % (ref 0–1.5)
BILIRUB SERPL-MCNC: 0.4 MG/DL (ref 0.2–1.2)
BUN SERPL-MCNC: 12 MG/DL (ref 6–20)
BUN/CREAT SERPL: 16.2 (ref 7–25)
CALCIUM SERPL-MCNC: 9 MG/DL (ref 8.6–10.5)
CHLORIDE SERPL-SCNC: 105 MMOL/L (ref 98–107)
CO2 SERPL-SCNC: 24.4 MMOL/L (ref 22–29)
CREAT SERPL-MCNC: 0.74 MG/DL (ref 0.57–1)
EOSINOPHIL # BLD AUTO: 0.14 10*3/MM3 (ref 0–0.4)
EOSINOPHIL NFR BLD AUTO: 2.4 % (ref 0.3–6.2)
ERYTHROCYTE [DISTWIDTH] IN BLOOD BY AUTOMATED COUNT: 13.4 % (ref 12.3–15.4)
GLOBULIN SER CALC-MCNC: 2.8 GM/DL
GLUCOSE SERPL-MCNC: 93 MG/DL (ref 65–99)
HCT VFR BLD AUTO: 41.1 % (ref 34–46.6)
HGB BLD-MCNC: 13.7 G/DL (ref 12–15.9)
IMM GRANULOCYTES # BLD AUTO: 0.02 10*3/MM3 (ref 0–0.05)
IMM GRANULOCYTES NFR BLD AUTO: 0.3 % (ref 0–0.5)
LYMPHOCYTES # BLD AUTO: 1.9 10*3/MM3 (ref 0.7–3.1)
LYMPHOCYTES NFR BLD AUTO: 32.4 % (ref 19.6–45.3)
MCH RBC QN AUTO: 29.8 PG (ref 26.6–33)
MCHC RBC AUTO-ENTMCNC: 33.3 G/DL (ref 31.5–35.7)
MCV RBC AUTO: 89.5 FL (ref 79–97)
MONOCYTES # BLD AUTO: 0.5 10*3/MM3 (ref 0.1–0.9)
MONOCYTES NFR BLD AUTO: 8.5 % (ref 5–12)
NEUTROPHILS # BLD AUTO: 3.26 10*3/MM3 (ref 1.7–7)
NEUTROPHILS NFR BLD AUTO: 55.7 % (ref 42.7–76)
NRBC BLD AUTO-RTO: 0 /100 WBC (ref 0–0.2)
PLATELET # BLD AUTO: 356 10*3/MM3 (ref 140–450)
POTASSIUM SERPL-SCNC: 4.4 MMOL/L (ref 3.5–5.2)
PROT SERPL-MCNC: 6.9 G/DL (ref 6–8.5)
RBC # BLD AUTO: 4.59 10*6/MM3 (ref 3.77–5.28)
SODIUM SERPL-SCNC: 139 MMOL/L (ref 136–145)
T3FREE SERPL-MCNC: 3.3 PG/ML (ref 2–4.4)
T4 FREE SERPL-MCNC: 1.78 NG/DL (ref 0.93–1.7)
TSH SERPL DL<=0.005 MIU/L-ACNC: 1.09 UIU/ML (ref 0.27–4.2)
WBC # BLD AUTO: 5.86 10*3/MM3 (ref 3.4–10.8)

## 2021-01-08 ENCOUNTER — OFFICE VISIT (OUTPATIENT)
Dept: FAMILY MEDICINE CLINIC | Facility: CLINIC | Age: 46
End: 2021-01-08

## 2021-01-08 VITALS
DIASTOLIC BLOOD PRESSURE: 78 MMHG | HEIGHT: 61 IN | WEIGHT: 170.6 LBS | BODY MASS INDEX: 32.21 KG/M2 | HEART RATE: 78 BPM | OXYGEN SATURATION: 99 % | SYSTOLIC BLOOD PRESSURE: 128 MMHG | TEMPERATURE: 96.9 F

## 2021-01-08 DIAGNOSIS — E55.9 VITAMIN D DEFICIENCY: ICD-10-CM

## 2021-01-08 DIAGNOSIS — F41.9 ANXIETY: ICD-10-CM

## 2021-01-08 DIAGNOSIS — I10 BENIGN HYPERTENSION: Primary | ICD-10-CM

## 2021-01-08 DIAGNOSIS — E03.9 ACQUIRED HYPOTHYROIDISM: ICD-10-CM

## 2021-01-08 PROCEDURE — 99213 OFFICE O/P EST LOW 20 MIN: CPT | Performed by: PHYSICIAN ASSISTANT

## 2021-01-08 RX ORDER — LEVOTHYROXINE SODIUM 88 UG/1
88 TABLET ORAL DAILY
Qty: 90 TABLET | Refills: 1 | Status: SHIPPED | OUTPATIENT
Start: 2021-01-08 | End: 2021-07-15

## 2021-01-08 RX ORDER — LISINOPRIL 10 MG/1
5 TABLET ORAL 2 TIMES DAILY
Qty: 90 TABLET | Refills: 1 | Status: SHIPPED | OUTPATIENT
Start: 2021-01-08 | End: 2021-07-15

## 2021-01-08 RX ORDER — CITALOPRAM 10 MG/1
10 TABLET ORAL NIGHTLY
Qty: 90 TABLET | Refills: 1 | Status: SHIPPED | OUTPATIENT
Start: 2021-01-08 | End: 2021-07-15

## 2021-01-08 NOTE — PROGRESS NOTES
Subjective   Vashti Hair is a 45 y.o. female who is being evaluated by video visit today in follow-up of hypertension, hypothyroidism, vitamin D deficiency, and moods.    History of Present Illness   You have chosen to receive care through a telehealth visit.  Do you consent to use a video/audio connection for your medical care today? Yes    Hypertension- BP has been better on twice daily dosing. No further lower BP that she is aware. No elevated BP in the afternoon.   · BP was lower in the morning and a little higher in the afternoon- averaging 105/70 in the morning and 130/80 in the afternoon.   · Previous blood pressure at her OBGYN was 130/90 in the afternoon and was low in office 12/2019 in the morning.    · She had no dizziness with standing and no signs of hypotension.    · We changed her Lisinopril 10 mg once daily to 1/2 tablet twice daily.   · She previously complained of her heart pounding hard (not fast) intermittent at night several times per week while in school but this resolved when school ended for the summer.   Thyroid- Taking Synthroid 88 mcg daily as directed. States when she is on break, it is more difficult to remember at the same time.no signs of hyperthyroidism or hypothyroidism- started feeling more tired in the last month.   Vitamin D- was taking it- ran out and did not take gain.     Moods- Doing well on Celexa without AE. She feels this is the correct dose and she would like to continue medication.   · At previous visit, patient reported some mood changes with anxiety, crying, and feeling unlike herself.  She does not typically have mood changes and is pretty calm, so this was abnormal for her.    · She was given Celexa 10 mg at bedtime, picked up the medication, but did not start it.    · She worked on increasing exercise, mindfulness, and has settling into her new job and her daughter has settled into college.    · She then started Celexa 10 mg and has had improvement in moods,  reporting she was glad she started it.   · She had 1 episode of dizziness on day 4 of medication, stopped the medication x 1 day, then resumed and has not had any recurrence or AE.       The following portions of the patient's history were reviewed and updated as appropriate: allergies, current medications, past family history, past medical history, past social history, past surgical history and problem list.    Review of Systems   Constitutional: Negative.    HENT: Negative.    Respiratory: Negative.    Cardiovascular: Negative.    Endocrine: Negative.    Neurological: Negative.    Psychiatric/Behavioral: Negative.        Objective    Vitals:    01/08/21 0839   BP: 128/78   Pulse: 78   Temp: 96.9 °F (36.1 °C)   SpO2: 99%      Body mass index is 32.25 kg/m².    Physical Exam   Constitutional: She is oriented to person, place, and time. She appears well-developed. No distress.   HENT:   Head: Normocephalic and atraumatic.   Right Ear: External ear normal.   Left Ear: External ear normal.   Nose: Nose normal.   Eyes: Conjunctivae and lids are normal. Right eye exhibits no discharge. Left eye exhibits no discharge.   Neck: Neck supple. Carotid bruit is not present.   Cardiovascular: Normal rate, regular rhythm, normal heart sounds and normal pulses. Exam reveals no gallop and no friction rub.   No murmur heard.  Pulmonary/Chest: Effort normal and breath sounds normal. No respiratory distress. She has no wheezes. She has no rhonchi. She has no rales.   Abdominal: Normal appearance.   Musculoskeletal: No deformity.   Neurological: She is alert and oriented to person, place, and time. Gait normal.   Skin: Skin is warm and dry.   Psychiatric: Her speech is normal and behavior is normal. Mood, memory, affect, judgment and thought content normal. She is attentive.   Nursing note and vitals reviewed.      Assessment/Plan   Diagnoses and all orders for this visit:    1. Benign hypertension (Primary)  -     lisinopril  (PRINIVIL,ZESTRIL) 10 MG tablet; Take 0.5 tablets by mouth 2 (two) times a day.  Dispense: 90 tablet; Refill: 1  -     Comprehensive Metabolic Panel  -     Lipid Panel With LDL / HDL Ratio  -     Urinalysis With Culture If Indicated -    2. Acquired hypothyroidism  -     levothyroxine (SYNTHROID, LEVOTHROID) 88 MCG tablet; Take 1 tablet by mouth Daily.  Dispense: 90 tablet; Refill: 1  -     CBC & Differential  -     Lipid Panel With LDL / HDL Ratio  -     TSH  -     T4, free  -     T3, Free    3. Vitamin D deficiency  -     CBC & Differential  -     Comprehensive Metabolic Panel  -     Vitamin D 25 Hydroxy    4. Anxiety  -     citalopram (CeleXA) 10 MG tablet; Take 1 tablet by mouth Every Night.  Dispense: 90 tablet; Refill: 1  -     CBC & Differential    Other orders  -     Microscopic Examination -       Assessment and Plan  Patient to have fasting labs.Call if no results in 1 week. Stability of conditions, plan, follow up, and further recommendations pending labs.  If stable labs, stable blood pressure, and no concerns, follow-up in 6 months, fasting with me.    · Hypertension- Blood pressure is stable today. Continue lisinopril 10 mg 1/2 tablet twice daily and monitor blood pressures.   · Hypothyroidism- She has been stable. Continue Synthroid 88 mcg daily.    · Vitamin D deficiency- Dosing recommendations pending labs.   · Anxiety- Moods are stable. Continue Celexa 10 mg once daily. She has had increased stress with work. She does not feel she needs medication change at this time. She should call or return if she feels she needs medication dosage adjustment or worsening moods. Otherwise, I will continue current dose.

## 2021-01-09 LAB
25(OH)D3+25(OH)D2 SERPL-MCNC: 26.2 NG/ML (ref 30–100)
ALBUMIN SERPL-MCNC: 4.4 G/DL (ref 3.5–5.2)
ALBUMIN/GLOB SERPL: 1.6 G/DL
ALP SERPL-CCNC: 52 U/L (ref 39–117)
ALT SERPL-CCNC: 16 U/L (ref 1–33)
APPEARANCE UR: CLEAR
AST SERPL-CCNC: 19 U/L (ref 1–32)
BACTERIA #/AREA URNS HPF: NORMAL /HPF
BASOPHILS # BLD AUTO: 0.06 10*3/MM3 (ref 0–0.2)
BASOPHILS NFR BLD AUTO: 0.7 % (ref 0–1.5)
BILIRUB SERPL-MCNC: 0.3 MG/DL (ref 0–1.2)
BILIRUB UR QL STRIP: NEGATIVE
BUN SERPL-MCNC: 14 MG/DL (ref 6–20)
BUN/CREAT SERPL: 18.7 (ref 7–25)
CALCIUM SERPL-MCNC: 9.6 MG/DL (ref 8.6–10.5)
CHLORIDE SERPL-SCNC: 101 MMOL/L (ref 98–107)
CHOLEST SERPL-MCNC: 160 MG/DL (ref 0–200)
CO2 SERPL-SCNC: 28.2 MMOL/L (ref 22–29)
COLOR UR: YELLOW
CREAT SERPL-MCNC: 0.75 MG/DL (ref 0.57–1)
EOSINOPHIL # BLD AUTO: 0.13 10*3/MM3 (ref 0–0.4)
EOSINOPHIL NFR BLD AUTO: 1.6 % (ref 0.3–6.2)
EPI CELLS #/AREA URNS HPF: NORMAL /HPF (ref 0–10)
ERYTHROCYTE [DISTWIDTH] IN BLOOD BY AUTOMATED COUNT: 13.1 % (ref 12.3–15.4)
GLOBULIN SER CALC-MCNC: 2.7 GM/DL
GLUCOSE SERPL-MCNC: 95 MG/DL (ref 65–99)
GLUCOSE UR QL: NEGATIVE
HCT VFR BLD AUTO: 42.2 % (ref 34–46.6)
HDLC SERPL-MCNC: 51 MG/DL (ref 40–60)
HGB BLD-MCNC: 13.8 G/DL (ref 12–15.9)
HGB UR QL STRIP: NEGATIVE
IMM GRANULOCYTES # BLD AUTO: 0.04 10*3/MM3 (ref 0–0.05)
IMM GRANULOCYTES NFR BLD AUTO: 0.5 % (ref 0–0.5)
KETONES UR QL STRIP: NEGATIVE
LDLC SERPL CALC-MCNC: 92 MG/DL (ref 0–100)
LDLC/HDLC SERPL: 1.79 {RATIO}
LEUKOCYTE ESTERASE UR QL STRIP: NEGATIVE
LYMPHOCYTES # BLD AUTO: 1.89 10*3/MM3 (ref 0.7–3.1)
LYMPHOCYTES NFR BLD AUTO: 22.9 % (ref 19.6–45.3)
MCH RBC QN AUTO: 29.9 PG (ref 26.6–33)
MCHC RBC AUTO-ENTMCNC: 32.7 G/DL (ref 31.5–35.7)
MCV RBC AUTO: 91.3 FL (ref 79–97)
MICRO URNS: NORMAL
MICRO URNS: NORMAL
MONOCYTES # BLD AUTO: 0.53 10*3/MM3 (ref 0.1–0.9)
MONOCYTES NFR BLD AUTO: 6.4 % (ref 5–12)
MUCOUS THREADS URNS QL MICRO: PRESENT /HPF
NEUTROPHILS # BLD AUTO: 5.59 10*3/MM3 (ref 1.7–7)
NEUTROPHILS NFR BLD AUTO: 67.9 % (ref 42.7–76)
NITRITE UR QL STRIP: NEGATIVE
NRBC BLD AUTO-RTO: 0 /100 WBC (ref 0–0.2)
PH UR STRIP: 6 [PH] (ref 5–7.5)
PLATELET # BLD AUTO: 384 10*3/MM3 (ref 140–450)
POTASSIUM SERPL-SCNC: 4.1 MMOL/L (ref 3.5–5.2)
PROT SERPL-MCNC: 7.1 G/DL (ref 6–8.5)
PROT UR QL STRIP: NEGATIVE
RBC # BLD AUTO: 4.62 10*6/MM3 (ref 3.77–5.28)
RBC #/AREA URNS HPF: NORMAL /HPF (ref 0–2)
SODIUM SERPL-SCNC: 139 MMOL/L (ref 136–145)
SP GR UR: 1.02 (ref 1–1.03)
T3FREE SERPL-MCNC: 3.6 PG/ML (ref 2–4.4)
T4 FREE SERPL-MCNC: 1.6 NG/DL (ref 0.93–1.7)
TRIGL SERPL-MCNC: 89 MG/DL (ref 0–150)
TSH SERPL DL<=0.005 MIU/L-ACNC: 1.53 UIU/ML (ref 0.27–4.2)
URINALYSIS REFLEX: NORMAL
UROBILINOGEN UR STRIP-MCNC: 0.2 MG/DL (ref 0.2–1)
VLDLC SERPL CALC-MCNC: 17 MG/DL (ref 5–40)
WBC # BLD AUTO: 8.24 10*3/MM3 (ref 3.4–10.8)
WBC #/AREA URNS HPF: NORMAL /HPF (ref 0–5)

## 2021-07-09 ENCOUNTER — OFFICE VISIT (OUTPATIENT)
Dept: FAMILY MEDICINE CLINIC | Facility: CLINIC | Age: 46
End: 2021-07-09

## 2021-07-09 VITALS
SYSTOLIC BLOOD PRESSURE: 122 MMHG | WEIGHT: 183 LBS | RESPIRATION RATE: 16 BRPM | TEMPERATURE: 98 F | DIASTOLIC BLOOD PRESSURE: 84 MMHG | HEIGHT: 61 IN | BODY MASS INDEX: 34.55 KG/M2 | HEART RATE: 74 BPM | OXYGEN SATURATION: 98 %

## 2021-07-09 DIAGNOSIS — N93.8 DYSFUNCTIONAL UTERINE BLEEDING: ICD-10-CM

## 2021-07-09 DIAGNOSIS — E03.9 ACQUIRED HYPOTHYROIDISM: ICD-10-CM

## 2021-07-09 DIAGNOSIS — R45.86 MOOD SWINGS: ICD-10-CM

## 2021-07-09 DIAGNOSIS — I10 BENIGN HYPERTENSION: Primary | ICD-10-CM

## 2021-07-09 DIAGNOSIS — E55.9 VITAMIN D DEFICIENCY: ICD-10-CM

## 2021-07-09 DIAGNOSIS — F41.9 ANXIETY: ICD-10-CM

## 2021-07-09 PROCEDURE — 99214 OFFICE O/P EST MOD 30 MIN: CPT | Performed by: PHYSICIAN ASSISTANT

## 2021-07-13 DIAGNOSIS — I10 BENIGN HYPERTENSION: ICD-10-CM

## 2021-07-13 DIAGNOSIS — F41.9 ANXIETY: ICD-10-CM

## 2021-07-13 DIAGNOSIS — E03.9 ACQUIRED HYPOTHYROIDISM: ICD-10-CM

## 2021-07-15 LAB
25(OH)D3+25(OH)D2 SERPL-MCNC: 38.5 NG/ML (ref 30–100)
ALBUMIN SERPL-MCNC: 4.3 G/DL (ref 3.5–5.2)
ALBUMIN/GLOB SERPL: 1.7 G/DL
ALP SERPL-CCNC: 47 U/L (ref 39–117)
ALT SERPL-CCNC: 27 U/L (ref 1–33)
AST SERPL-CCNC: 20 U/L (ref 1–32)
BILIRUB SERPL-MCNC: 0.4 MG/DL (ref 0–1.2)
BUN SERPL-MCNC: 11 MG/DL (ref 6–20)
BUN/CREAT SERPL: 11.6 (ref 7–25)
CALCIUM SERPL-MCNC: 9.4 MG/DL (ref 8.6–10.5)
CHLORIDE SERPL-SCNC: 105 MMOL/L (ref 98–107)
CO2 SERPL-SCNC: 24.9 MMOL/L (ref 22–29)
CREAT SERPL-MCNC: 0.95 MG/DL (ref 0.57–1)
DHEA SERPL-MCNC: 161 NG/DL (ref 31–701)
DHEA-S SERPL-MCNC: 85.4 UG/DL (ref 41.2–243.7)
ESTRADIOL SERPL-MCNC: 54.8 PG/ML
FSH SERPL-ACNC: 6.3 MIU/ML
GLOBULIN SER CALC-MCNC: 2.5 GM/DL
GLUCOSE SERPL-MCNC: 103 MG/DL (ref 65–99)
LH SERPL-ACNC: 4.1 MIU/ML
POTASSIUM SERPL-SCNC: 5.1 MMOL/L (ref 3.5–5.2)
PROGEST SERPL-MCNC: 6.4 NG/ML
PROT SERPL-MCNC: 6.8 G/DL (ref 6–8.5)
SODIUM SERPL-SCNC: 140 MMOL/L (ref 136–145)
T3FREE SERPL-MCNC: 3.3 PG/ML (ref 2–4.4)
T4 FREE SERPL-MCNC: 1.75 NG/DL (ref 0.93–1.7)
TESTOST SERPL-MCNC: 14.6 NG/DL
TSH SERPL DL<=0.005 MIU/L-ACNC: 1.13 UIU/ML (ref 0.27–4.2)

## 2021-07-15 RX ORDER — CITALOPRAM 10 MG/1
10 TABLET ORAL NIGHTLY
Qty: 90 TABLET | Refills: 0 | Status: SHIPPED | OUTPATIENT
Start: 2021-07-15 | End: 2021-08-15 | Stop reason: SDUPTHER

## 2021-07-15 RX ORDER — LEVOTHYROXINE SODIUM 88 UG/1
88 TABLET ORAL DAILY
Qty: 90 TABLET | Refills: 0 | Status: SHIPPED | OUTPATIENT
Start: 2021-07-15 | End: 2021-10-13

## 2021-07-15 RX ORDER — LISINOPRIL 10 MG/1
TABLET ORAL
Qty: 90 TABLET | Refills: 0 | Status: SHIPPED | OUTPATIENT
Start: 2021-07-15 | End: 2021-11-17

## 2021-08-15 DIAGNOSIS — F41.9 ANXIETY: ICD-10-CM

## 2021-08-15 RX ORDER — CITALOPRAM 20 MG/1
20 TABLET ORAL DAILY
Qty: 90 TABLET | Refills: 1 | Status: SHIPPED | OUTPATIENT
Start: 2021-08-15 | End: 2021-11-29 | Stop reason: SDUPTHER

## 2021-09-24 DIAGNOSIS — E03.9 ACQUIRED HYPOTHYROIDISM: Primary | ICD-10-CM

## 2021-09-24 DIAGNOSIS — R73.09 ELEVATED GLUCOSE: ICD-10-CM

## 2021-09-28 ENCOUNTER — TELEMEDICINE (OUTPATIENT)
Dept: FAMILY MEDICINE CLINIC | Facility: CLINIC | Age: 46
End: 2021-09-28

## 2021-09-28 DIAGNOSIS — F41.9 ANXIETY: ICD-10-CM

## 2021-09-28 DIAGNOSIS — E03.9 ACQUIRED HYPOTHYROIDISM: ICD-10-CM

## 2021-09-28 DIAGNOSIS — E55.9 VITAMIN D DEFICIENCY: ICD-10-CM

## 2021-09-28 DIAGNOSIS — I10 BENIGN HYPERTENSION: Primary | ICD-10-CM

## 2021-09-28 LAB
ALBUMIN SERPL-MCNC: 4.4 G/DL (ref 3.5–5.2)
ALBUMIN/GLOB SERPL: 2.1 G/DL
ALP SERPL-CCNC: 55 U/L (ref 39–117)
ALT SERPL-CCNC: 19 U/L (ref 1–33)
AST SERPL-CCNC: 18 U/L (ref 1–32)
BILIRUB SERPL-MCNC: 0.4 MG/DL (ref 0–1.2)
BUN SERPL-MCNC: 13 MG/DL (ref 6–20)
BUN/CREAT SERPL: 16.9 (ref 7–25)
CALCIUM SERPL-MCNC: 9 MG/DL (ref 8.6–10.5)
CHLORIDE SERPL-SCNC: 105 MMOL/L (ref 98–107)
CO2 SERPL-SCNC: 22.4 MMOL/L (ref 22–29)
CREAT SERPL-MCNC: 0.77 MG/DL (ref 0.57–1)
GLOBULIN SER CALC-MCNC: 2.1 GM/DL
GLUCOSE SERPL-MCNC: 96 MG/DL (ref 65–99)
POTASSIUM SERPL-SCNC: 4.2 MMOL/L (ref 3.5–5.2)
PROT SERPL-MCNC: 6.5 G/DL (ref 6–8.5)
SODIUM SERPL-SCNC: 140 MMOL/L (ref 136–145)
T3FREE SERPL-MCNC: 3.5 PG/ML (ref 2–4.4)
T4 FREE SERPL-MCNC: 1.68 NG/DL (ref 0.93–1.7)
TSH SERPL DL<=0.005 MIU/L-ACNC: 2.75 UIU/ML (ref 0.27–4.2)

## 2021-09-28 PROCEDURE — 99213 OFFICE O/P EST LOW 20 MIN: CPT | Performed by: PHYSICIAN ASSISTANT

## 2021-09-28 NOTE — PROGRESS NOTES
Subjective   Vashti Hair is a 46 y.o. female who is being evaluated by video visit today in follow-up of hypertension, hypothyroidism, vitamin D deficiency, and moods.    HPI  You have chosen to receive care through a telehealth visit.  Do you consent to use a video/audio connection for your medical care today? Yes    Hypertension- BP has been better on twice daily dosing. No further lower BP that she is aware. Has not checked BP- she is taking Lisinopril 1/2 twice daily. No AE.    · BP was lower in the morning and a little higher in the afternoon- averaging 105/70 in the morning and 130/80 in the afternoon.   · Previous blood pressure at her OBGYN was 130/90 in the afternoon and was low in office 12/2019 in the morning.    · She had no dizziness with standing and no signs of hypotension.    · We changed her Lisinopril 10 mg once daily to 1/2 tablet twice daily.   · She previously complained of her heart pounding hard (not fast) intermittent at night several times per week while in school but this resolved when school ended for the summer.   Thyroid- Taking Synthroid 88 mcg daily as directed. She has noticed her hair texture was different for the last few months- started over the summer. She is also noticing being very hot when she is trying to get ready and has to have a fan in her bathroom. She continues with increased irritability. No other heat intolerance, diarrhea.   Vitamin D- taking vitamin D.     Moods- Increased Celexa to 20 mg- not sure that it is any different. Has noticed no worsening or AE but no improvement in irritability, agitation.   · At previous visit, patient reported some mood changes with anxiety, crying, and feeling unlike herself.  She does not typically have mood changes and is pretty calm, so this was abnormal for her.    · She was given Celexa 10 mg at bedtime, picked up the medication, but did not start it.    · She worked on increasing exercise, mindfulness, and has settling into her  new job and her daughter has settled into college.    · She then started Celexa 10 mg and has had improvement in moods, reporting she was glad she started it.   · She had 1 episode of dizziness on day 4 of medication, stopped the medication x 1 day, then resumed and has not had any recurrence or AE.    · 7/2021- She had increased irritability, mood swings, and feeling like moods are less controlled. She is short tempered. She has also had abnormal bleeding- with change in pattern and heaviness of menses.    · Sister has taken something for moods. She     The following portions of the patient's history were reviewed and updated as appropriate: allergies, current medications, past family history, past medical history, past social history, past surgical history and problem list.    Review of Systems   Constitutional: Negative.    HENT: Negative.    Respiratory: Negative.    Cardiovascular: Negative.    Gastrointestinal: Negative.    Endocrine: Positive for heat intolerance.   Skin:        Hair texture change   Neurological: Negative.    Psychiatric/Behavioral: Positive for agitation and dysphoric mood. The patient is nervous/anxious.        Objective    There were no vitals filed for this visit.   There is no height or weight on file to calculate BMI.    Physical Exam   Constitutional: She appears well-developed. No distress.   HENT:   Head: Normocephalic and atraumatic.   Eyes: Right eye exhibits no discharge. Left eye exhibits no discharge.   Pulmonary/Chest: Effort normal. No respiratory distress.   Skin: Skin is dry.   Psychiatric: Her speech is normal and behavior is normal. She is attentive.       Assessment/Plan   Diagnoses and all orders for this visit:    1. Benign hypertension (Primary)    2. Acquired hypothyroidism    3. Vitamin D deficiency    4. Anxiety           Assessment and Plan  Patient had thyroid and CMP rechecked. She should change Celexa as discussed, call me if no improvement, worsening, AE, or  any other concerns. Otherwise, follow up in 1/2021 for her 6 month follow up with fasting labs.     · Hypertension- Blood pressure has been stable. Continue lisinopril 10 mg 1/2 tablet twice daily and monitor blood pressures.   · Hypothyroidism- Thyroid was borderline 7/2021, and she was having some mood and menstrual concerns. She has also had some heat intolerance or trouble with being hot when getting ready. Labs from yesterday with slight improvement in Free T4 to the upper normal range, normal Free T3, and TSH is up to 2.75. I would not recommend decreasing levothyroxine at this time. Continue Synthroid 88 mcg daily. I will recheck thyroid at follow up.   · Vitamin D deficiency- Last labs were stable. Continue vitamin D. I will recheck at follow up.   · Anxiety- Moods were stable then had been more uncontrolled with irritability, mood swings, feeling hot when trying to get ready, and change in menses. Patient could be perimenopausal. We increased Celexa from 10 mg once daily to 20 mg once daily without improvement in moods. I will increase again to 40 mg once daily. If she has not improvement or has AE with medication, I would recommend she decrease back to 20 mg, and we will change medication. She will find out what her sister did well with when she went through perimenopause/ menopause to consider that medication. She can also talk with GYN about symptoms and menses. To be seen if worsening moods or AE with medication.      I spent 25 minutes for video visit for Vashti Hair on this date of service. This time includes time spent by me in the following activities as necessary: preparing for the visit, reviewing tests, specialists records and previous visits, counseling and educating the patient, documenting information in the medical record, independently interpreting results and communicating that information with the patient and developing a medically appropriate treatment plan with consideration of other  conditions, medications, and treatments.

## 2021-10-13 DIAGNOSIS — E03.9 ACQUIRED HYPOTHYROIDISM: ICD-10-CM

## 2021-10-13 RX ORDER — LEVOTHYROXINE SODIUM 88 UG/1
88 TABLET ORAL DAILY
Qty: 90 TABLET | Refills: 0 | Status: SHIPPED | OUTPATIENT
Start: 2021-10-13 | End: 2021-12-29

## 2021-11-17 DIAGNOSIS — I10 BENIGN HYPERTENSION: ICD-10-CM

## 2021-11-17 RX ORDER — LISINOPRIL 10 MG/1
TABLET ORAL
Qty: 90 TABLET | Refills: 0 | Status: SHIPPED | OUTPATIENT
Start: 2021-11-17 | End: 2022-02-08

## 2021-11-17 NOTE — TELEPHONE ENCOUNTER
Last ov 9/28/21    · Patient had thyroid and CMP rechecked. She should change Celexa as discussed, call me if no improvement, worsening, AE, or any other concerns. Otherwise, follow up in 1/2021 for her 6 month follow up with fasting laHypertension- Blood pressure has been stable. Continue lisinopril 10 mg 1/2 tablet twice daily and monitor blood pressures..     Last lab 9/27/21

## 2021-11-29 DIAGNOSIS — F41.9 ANXIETY: ICD-10-CM

## 2021-11-29 RX ORDER — CITALOPRAM 20 MG/1
20 TABLET ORAL DAILY
Qty: 30 TABLET | Refills: 0 | Status: SHIPPED | OUTPATIENT
Start: 2021-11-29 | End: 2021-12-29

## 2021-11-29 NOTE — TELEPHONE ENCOUNTER
Caller: Vashti Hair    Relationship: Self    Best call back number: [phone]     Requested Prescriptions:   Requested Prescriptions     Pending Prescriptions Disp Refills   • citalopram (CeleXA) 20 MG tablet 90 tablet 1     Sig: Take 1 tablet by mouth Daily.        Pharmacy where request should be sent: [facility] [address] - [phone]  - [phone] FX     Additional details provided by patient: PATIENT STATES SHE IS OUT OF MEDICATION AND NEEDS 20MG TABLETS     Does the patient have less than a 3 day supply:  [x] Yes  [] No    Jojo Cotto Rep   11/29/21 13:40 EST

## 2021-12-29 DIAGNOSIS — E03.9 ACQUIRED HYPOTHYROIDISM: ICD-10-CM

## 2021-12-29 DIAGNOSIS — F41.9 ANXIETY: ICD-10-CM

## 2021-12-29 RX ORDER — LEVOTHYROXINE SODIUM 88 UG/1
88 TABLET ORAL DAILY
Qty: 90 TABLET | Refills: 0 | Status: SHIPPED | OUTPATIENT
Start: 2021-12-29 | End: 2022-04-16

## 2021-12-29 RX ORDER — CITALOPRAM 20 MG/1
TABLET ORAL
Qty: 30 TABLET | Refills: 0 | Status: SHIPPED | OUTPATIENT
Start: 2021-12-29 | End: 2022-05-17

## 2022-02-08 DIAGNOSIS — I10 BENIGN HYPERTENSION: ICD-10-CM

## 2022-02-08 RX ORDER — LISINOPRIL 10 MG/1
TABLET ORAL
Qty: 90 TABLET | Refills: 0 | Status: SHIPPED | OUTPATIENT
Start: 2022-02-08 | End: 2022-05-31

## 2022-04-14 DIAGNOSIS — E03.9 ACQUIRED HYPOTHYROIDISM: ICD-10-CM

## 2022-04-16 RX ORDER — LEVOTHYROXINE SODIUM 88 UG/1
88 TABLET ORAL DAILY
Qty: 90 TABLET | Refills: 0 | Status: SHIPPED | OUTPATIENT
Start: 2022-04-16 | End: 2022-07-25

## 2022-05-16 DIAGNOSIS — F41.9 ANXIETY: ICD-10-CM

## 2022-05-17 RX ORDER — CITALOPRAM 20 MG/1
TABLET ORAL
Qty: 30 TABLET | Refills: 0 | Status: SHIPPED | OUTPATIENT
Start: 2022-05-17 | End: 2022-06-21

## 2022-05-17 NOTE — TELEPHONE ENCOUNTER
PATIENT DID MAKE FIRST AVAILABLE APPT ON 06/10/22 AND SHE IS ASKING TO GET ENOUGH TO GET HER TO APPT IF POSSIBLE.      PLEASE ADVISE     CALLBACK NUMBER IS  [id number]        CONFIRMED PHARMACY  [facility] [place], [place] - [address] [place] [address] Rd - [phone]  - [phone] FX

## 2022-05-31 DIAGNOSIS — I10 BENIGN HYPERTENSION: ICD-10-CM

## 2022-05-31 RX ORDER — LISINOPRIL 10 MG/1
TABLET ORAL
Qty: 90 TABLET | Refills: 0 | Status: SHIPPED | OUTPATIENT
Start: 2022-05-31 | End: 2022-09-20

## 2022-06-10 ENCOUNTER — OFFICE VISIT (OUTPATIENT)
Dept: FAMILY MEDICINE CLINIC | Facility: CLINIC | Age: 47
End: 2022-06-10

## 2022-06-10 VITALS
TEMPERATURE: 97.5 F | OXYGEN SATURATION: 98 % | DIASTOLIC BLOOD PRESSURE: 82 MMHG | SYSTOLIC BLOOD PRESSURE: 114 MMHG | HEART RATE: 86 BPM | HEIGHT: 61 IN | WEIGHT: 185 LBS | BODY MASS INDEX: 34.93 KG/M2

## 2022-06-10 DIAGNOSIS — E55.9 VITAMIN D DEFICIENCY: ICD-10-CM

## 2022-06-10 DIAGNOSIS — N92.4 EXCESSIVE BLEEDING IN PREMENOPAUSAL PERIOD: ICD-10-CM

## 2022-06-10 DIAGNOSIS — N93.8 DYSFUNCTIONAL UTERINE BLEEDING: ICD-10-CM

## 2022-06-10 DIAGNOSIS — R45.86 MOOD SWINGS: ICD-10-CM

## 2022-06-10 DIAGNOSIS — E03.9 ACQUIRED HYPOTHYROIDISM: ICD-10-CM

## 2022-06-10 DIAGNOSIS — R73.09 ELEVATED GLUCOSE: ICD-10-CM

## 2022-06-10 DIAGNOSIS — I10 BENIGN HYPERTENSION: Primary | ICD-10-CM

## 2022-06-10 DIAGNOSIS — F41.9 ANXIETY: ICD-10-CM

## 2022-06-10 PROCEDURE — 99214 OFFICE O/P EST MOD 30 MIN: CPT | Performed by: PHYSICIAN ASSISTANT

## 2022-06-10 RX ORDER — BROMPHENIRAMINE MALEATE, PSEUDOEPHEDRINE HYDROCHLORIDE, AND DEXTROMETHORPHAN HYDROBROMIDE 2; 30; 10 MG/5ML; MG/5ML; MG/5ML
SYRUP ORAL
COMMUNITY
Start: 2022-04-06 | End: 2022-10-14

## 2022-06-10 NOTE — PROGRESS NOTES
Subjective   Vashti Hair is a 47 y.o. female who presents today in follow-up of hypertension, hypothyroidism, vitamin D deficiency, and moods.    HPI     The only thing she found that will work is Keto diet-     Hypertension- BP has been better on twice daily dosing. No further lower BP that she is aware. Has not checked BP- she is taking Lisinopril 1/2 twice daily. No AE. Diastolic consistently 82-85 and systolic consistently 115.   · BP was lower in the morning and a little higher in the afternoon- averaging 105/70 in the morning and 130/80 in the afternoon.   · Previous blood pressure at her OBGYN was 130/90 in the afternoon and was low in office 12/2019 in the morning.    · She had no dizziness with standing and no signs of hypotension.    · We changed her Lisinopril 10 mg once daily to 1/2 tablet twice daily.   · She previously complained of her heart pounding hard (not fast) intermittent at night several times per week while in school but this resolved when school ended for the summer.   Thyroid- Taking Synthroid 88 mcg daily as directed. She has noticed her hair texture was different for the last few months- started over the summer. She is also noticing being very hot when she is trying to get ready and has to have a fan in her bathroom. She continues with increased irritability. No other heat intolerance, diarrhea.   Vitamin D- taking vitamin D 5000 IU daily.    Taking vitamin C.     Moods- increased Celexa to 40 mg once daily- could not get out of bed. She went back to 20 mg and did well. She has felt better. No longer severe mood swings.   · At previous visit, patient reported some mood changes with anxiety, crying, and feeling unlike herself.  She does not typically have mood changes and is pretty calm, so this was abnormal for her.    · She was given Celexa 10 mg at bedtime, picked up the medication, but did not start it.    · She worked on increasing exercise, mindfulness, and has settling into her  new job and her daughter has settled into college.    · She then started Celexa 10 mg and has had improvement in moods, reporting she was glad she started it.   · She had 1 episode of dizziness on day 4 of medication, stopped the medication x 1 day, then resumed and has not had any recurrence or AE.    · 7/2021- She had increased irritability, mood swings, and feeling like moods are less controlled. She is short tempered. She has also had abnormal bleeding- with change in pattern and heaviness of menses.    · 9/2021- Increased Celexa to 20 mg- she had no worsening or AE but no improvement in irritability, agitation.  · Sister has taken something for moods. She     DUB- this past month- very heavy. She has bled through 3 days in a row. Usually not that heavy.   [facility] [employer]. 10/2021- they cancelled.     The following portions of the patient's history were reviewed and updated as appropriate: allergies, current medications, past family history, past medical history, past social history, past surgical history and problem list.    Review of Systems   Constitutional: Negative.    HENT: Negative.    Respiratory: Negative.    Cardiovascular: Negative.    Gastrointestinal: Negative.    Endocrine: Positive for heat intolerance.   Genitourinary: Positive for menstrual problem.   Skin:        Hair texture change   Neurological: Negative.    Psychiatric/Behavioral: Positive for agitation and dysphoric mood. The patient is nervous/anxious.        Objective    Vitals:    06/10/22 1042   BP: 114/82   Pulse: 86   Temp: 97.5 °F (36.4 °C)   SpO2: 98%      Body mass index is 34.97 kg/m².    Physical Exam   Constitutional: She is oriented to person, place, and time. She appears well-developed. No distress.   HENT:   Head: Normocephalic and atraumatic.   Right Ear: External ear normal.   Left Ear: External ear normal.   Nose: Nose normal.   Eyes: Conjunctivae and lids are normal. Right eye exhibits no discharge. Left eye  exhibits no discharge.   Neck: Carotid bruit is not present.   Cardiovascular: Normal rate, regular rhythm, normal heart sounds and normal pulses. Exam reveals no gallop and no friction rub.   No murmur heard.  Pulmonary/Chest: Effort normal and breath sounds normal. No respiratory distress. She has no wheezes. She has no rhonchi. She has no rales.   Abdominal: Normal appearance.   Musculoskeletal: No deformity.   Neurological: She is alert and oriented to person, place, and time. Gait normal.   Skin: Skin is warm and dry.   Psychiatric: Her speech is normal and behavior is normal. Mood, judgment and thought content normal. She is attentive.   Nursing note and vitals reviewed.      Assessment & Plan   Diagnoses and all orders for this visit:    1. Benign hypertension (Primary)  -     Comprehensive Metabolic Panel  -     Urinalysis With Culture If Indicated -    2. Acquired hypothyroidism  -     CBC & Differential  -     Hemoglobin A1c  -     Lipid Panel With LDL / HDL Ratio  -     TSH  -     T4, free  -     T3, Free    3. Elevated glucose  -     Comprehensive Metabolic Panel  -     Hemoglobin A1c    4. Vitamin D deficiency  -     Comprehensive Metabolic Panel  -     Vitamin D 25 Hydroxy    5. Anxiety    6. Mood swings    7. Dysfunctional uterine bleeding    8. Excessive bleeding in premenopausal period  -     CBC & Differential  -     Iron and TIBC  -     Ferritin           Assessment and Plan  Patient will have fasting labs. Call if no results in 1 week. Stability of conditions, plan, follow up, and further recommendations pending labs. Follow up in 3-6 months pending results.       · Hypertension- Blood pressure is stable today. Continue lisinopril 10 mg 1/2 tablet twice daily and monitor blood pressures.   · Hypothyroidism- Thyroid was borderline 7/2021, and she was having some mood and menstrual concerns. She has also had some heat intolerance or trouble with being hot when getting ready. She then had slight  improvement in Free T4 to the upper normal range, normal Free T3, and TSH is up to 2.75 9/2021. Continue Synthroid 88 mcg daily. Await labs for further recommendations.   · Elevated glucose- I will check A1C with labs today.   · Vitamin D deficiency- Last labs were stable. Continue vitamin D. Dosing recommendations pending labs.   · Anxiety- Moods were stable then had been more uncontrolled with irritability, mood swings, feeling hot when trying to get ready, and change in menses. Patient could be perimenopausal. We increased Celexa from 10 mg once daily to 20 mg once daily without improvement in moods then to 40 mg once daily. She did not tolerate increased dose and resumed 20 mg once daily. Consideration of changing to Effexor or Pristiq if uncontrolled moods, symptoms. She will find out what her sister did well with when she went through perimenopause/ menopause to consider that medication. She can also talk with GYN about symptoms and menses. To be seen if worsening moods or AE with medication.    · Menorrhagia, change in menses- Patient to contact her GYN for follow up and to discuss menstrual changes and symptoms. I will check iron with heavy bleeding and will recheck thyroid.     I spent 30 minutes caring for Vashti Hair on this date of service. This time includes time spent by me in the following activities as necessary: preparing for the visit, reviewing tests, specialists records and previous visits, obtaining and/or reviewing a separately obtained history, performing a medically appropriate exam and/or evaluation, counseling and educating the patient, family, caregiver, referring and/or communicating with other healthcare professionals, documenting information in the medical record, independently interpreting results and communicating that information with the patient, family, caregiver, and developing a medically appropriate treatment plan with consideration of other conditions, medications, and  treatments.

## 2022-06-12 LAB
25(OH)D3+25(OH)D2 SERPL-MCNC: 46.3 NG/ML (ref 30–100)
ALBUMIN SERPL-MCNC: 4.4 G/DL (ref 3.8–4.8)
ALBUMIN/GLOB SERPL: 1.6 {RATIO} (ref 1.2–2.2)
ALP SERPL-CCNC: 60 IU/L (ref 44–121)
ALT SERPL-CCNC: 22 IU/L (ref 0–32)
APPEARANCE UR: ABNORMAL
AST SERPL-CCNC: 23 IU/L (ref 0–40)
BACTERIA #/AREA URNS HPF: ABNORMAL /[HPF]
BACTERIA UR CULT: NORMAL
BACTERIA UR CULT: NORMAL
BASOPHILS # BLD AUTO: 0.1 X10E3/UL (ref 0–0.2)
BASOPHILS NFR BLD AUTO: 1 %
BILIRUB SERPL-MCNC: 0.4 MG/DL (ref 0–1.2)
BILIRUB UR QL STRIP: NEGATIVE
BUN SERPL-MCNC: 12 MG/DL (ref 6–24)
BUN/CREAT SERPL: 16 (ref 9–23)
CALCIUM SERPL-MCNC: 9.4 MG/DL (ref 8.7–10.2)
CASTS URNS MICRO: ABNORMAL
CASTS URNS QL MICRO: PRESENT /LPF
CHLORIDE SERPL-SCNC: 104 MMOL/L (ref 96–106)
CHOLEST SERPL-MCNC: 158 MG/DL (ref 100–199)
CO2 SERPL-SCNC: 21 MMOL/L (ref 20–29)
COLOR UR: YELLOW
CREAT SERPL-MCNC: 0.76 MG/DL (ref 0.57–1)
EGFRCR SERPLBLD CKD-EPI 2021: 97 ML/MIN/1.73
EOSINOPHIL # BLD AUTO: 0.1 X10E3/UL (ref 0–0.4)
EOSINOPHIL NFR BLD AUTO: 2 %
EPI CELLS #/AREA URNS HPF: ABNORMAL /HPF (ref 0–10)
ERYTHROCYTE [DISTWIDTH] IN BLOOD BY AUTOMATED COUNT: 13.8 % (ref 11.7–15.4)
FERRITIN SERPL-MCNC: 23 NG/ML (ref 15–150)
GLOBULIN SER CALC-MCNC: 2.8 G/DL (ref 1.5–4.5)
GLUCOSE SERPL-MCNC: 101 MG/DL (ref 65–99)
GLUCOSE UR QL STRIP: NEGATIVE
HBA1C MFR BLD: 5.7 % (ref 4.8–5.6)
HCT VFR BLD AUTO: 43.4 % (ref 34–46.6)
HDLC SERPL-MCNC: 39 MG/DL
HGB BLD-MCNC: 14.4 G/DL (ref 11.1–15.9)
HGB UR QL STRIP: NEGATIVE
IMM GRANULOCYTES # BLD AUTO: 0 X10E3/UL (ref 0–0.1)
IMM GRANULOCYTES NFR BLD AUTO: 0 %
IRON SATN MFR SERPL: 23 % (ref 15–55)
IRON SERPL-MCNC: 100 UG/DL (ref 27–159)
KETONES UR QL STRIP: ABNORMAL
LDLC SERPL CALC-MCNC: 102 MG/DL (ref 0–99)
LDLC/HDLC SERPL: 2.6 RATIO (ref 0–3.2)
LEUKOCYTE ESTERASE UR QL STRIP: ABNORMAL
LYMPHOCYTES # BLD AUTO: 1.7 X10E3/UL (ref 0.7–3.1)
LYMPHOCYTES NFR BLD AUTO: 29 %
MCH RBC QN AUTO: 29.3 PG (ref 26.6–33)
MCHC RBC AUTO-ENTMCNC: 33.2 G/DL (ref 31.5–35.7)
MCV RBC AUTO: 88 FL (ref 79–97)
MICRO URNS: ABNORMAL
MONOCYTES # BLD AUTO: 0.6 X10E3/UL (ref 0.1–0.9)
MONOCYTES NFR BLD AUTO: 10 %
MUCOUS THREADS URNS QL MICRO: PRESENT
NEUTROPHILS # BLD AUTO: 3.4 X10E3/UL (ref 1.4–7)
NEUTROPHILS NFR BLD AUTO: 58 %
NITRITE UR QL STRIP: NEGATIVE
PH UR STRIP: 5.5 [PH] (ref 5–7.5)
PLATELET # BLD AUTO: 434 X10E3/UL (ref 150–450)
POTASSIUM SERPL-SCNC: 4.7 MMOL/L (ref 3.5–5.2)
PROT SERPL-MCNC: 7.2 G/DL (ref 6–8.5)
PROT UR QL STRIP: ABNORMAL
RBC # BLD AUTO: 4.92 X10E6/UL (ref 3.77–5.28)
RBC #/AREA URNS HPF: ABNORMAL /HPF (ref 0–2)
SODIUM SERPL-SCNC: 139 MMOL/L (ref 134–144)
SP GR UR STRIP: >=1.03 (ref 1–1.03)
T3FREE SERPL-MCNC: 2.8 PG/ML (ref 2–4.4)
T4 FREE SERPL-MCNC: 1.94 NG/DL (ref 0.82–1.77)
TIBC SERPL-MCNC: 436 UG/DL (ref 250–450)
TRIGL SERPL-MCNC: 90 MG/DL (ref 0–149)
TSH SERPL DL<=0.005 MIU/L-ACNC: 1.23 UIU/ML (ref 0.45–4.5)
UIBC SERPL-MCNC: 336 UG/DL (ref 131–425)
URINALYSIS REFLEX: ABNORMAL
UROBILINOGEN UR STRIP-MCNC: 0.2 MG/DL (ref 0.2–1)
VLDLC SERPL CALC-MCNC: 17 MG/DL (ref 5–40)
WBC # BLD AUTO: 5.9 X10E3/UL (ref 3.4–10.8)
WBC #/AREA URNS HPF: >30 /HPF (ref 0–5)

## 2022-06-20 DIAGNOSIS — F41.9 ANXIETY: ICD-10-CM

## 2022-06-20 NOTE — TELEPHONE ENCOUNTER
Next ov 12/12/22    Last ov 06/10/22    Last lab 06/10/22    I was unable to look at your last office note for some reason so I could not see if you wanted patient to continue medication or not.

## 2022-06-21 RX ORDER — CITALOPRAM 20 MG/1
20 TABLET ORAL DAILY
Qty: 90 TABLET | Refills: 1 | Status: SHIPPED | OUTPATIENT
Start: 2022-06-21 | End: 2023-02-10

## 2022-07-25 DIAGNOSIS — E03.9 ACQUIRED HYPOTHYROIDISM: ICD-10-CM

## 2022-07-25 RX ORDER — LEVOTHYROXINE SODIUM 88 UG/1
88 TABLET ORAL DAILY
Qty: 90 TABLET | Refills: 0 | Status: SHIPPED | OUTPATIENT
Start: 2022-07-25 | End: 2022-11-07

## 2022-09-20 DIAGNOSIS — I10 BENIGN HYPERTENSION: ICD-10-CM

## 2022-09-20 RX ORDER — LISINOPRIL 10 MG/1
TABLET ORAL
Qty: 90 TABLET | Refills: 0 | Status: SHIPPED | OUTPATIENT
Start: 2022-09-20 | End: 2023-01-17

## 2022-10-14 ENCOUNTER — OFFICE VISIT (OUTPATIENT)
Dept: FAMILY MEDICINE CLINIC | Facility: CLINIC | Age: 47
End: 2022-10-14

## 2022-10-14 VITALS
BODY MASS INDEX: 35.12 KG/M2 | HEART RATE: 78 BPM | HEIGHT: 61 IN | SYSTOLIC BLOOD PRESSURE: 112 MMHG | WEIGHT: 186 LBS | OXYGEN SATURATION: 99 % | TEMPERATURE: 98 F | RESPIRATION RATE: 16 BRPM | DIASTOLIC BLOOD PRESSURE: 82 MMHG

## 2022-10-14 DIAGNOSIS — N93.8 DYSFUNCTIONAL UTERINE BLEEDING: ICD-10-CM

## 2022-10-14 DIAGNOSIS — E55.9 VITAMIN D DEFICIENCY: ICD-10-CM

## 2022-10-14 DIAGNOSIS — F41.9 ANXIETY: ICD-10-CM

## 2022-10-14 DIAGNOSIS — E78.5 DYSLIPIDEMIA: ICD-10-CM

## 2022-10-14 DIAGNOSIS — E03.9 ACQUIRED HYPOTHYROIDISM: ICD-10-CM

## 2022-10-14 DIAGNOSIS — R73.03 PREDIABETES: ICD-10-CM

## 2022-10-14 DIAGNOSIS — R45.86 MOOD SWINGS: ICD-10-CM

## 2022-10-14 DIAGNOSIS — I10 BENIGN HYPERTENSION: Primary | ICD-10-CM

## 2022-10-14 PROCEDURE — 99214 OFFICE O/P EST MOD 30 MIN: CPT | Performed by: PHYSICIAN ASSISTANT

## 2022-10-14 RX ORDER — MULTIVIT WITH MINERALS/LUTEIN
1000 TABLET ORAL DAILY
COMMUNITY

## 2022-10-14 NOTE — PROGRESS NOTES
Subjective   Vashti Hair is a 47 y.o. female who presents today in follow-up of hypertension, hypothyroidism, vitamin D deficiency, and moods.    HPI     Kids are gone and not eating out as much.     When moving 1 1/2 months ago- she ran out for 1 week and was crying all the time and felt horrible. Felt better with medication. She has decreased motivation and is really bothersome. Not motivated to exercise at all. She would have previously     The only thing she found that will work is Keto diet-     Hypertension- BP has been better on twice daily dosing. No further lower BP that she is aware. Has not checked BP- she is taking Lisinopril 1/2 twice daily. No AE. Diastolic consistently 82-85 and systolic consistently 115.   · BP was lower in the morning and a little higher in the afternoon- averaging 105/70 in the morning and 130/80 in the afternoon.   · Previous blood pressure at her OBGYN was 130/90 in the afternoon and was low in office 12/2019 in the morning.    · She had no dizziness with standing and no signs of hypotension.    · We changed her Lisinopril 10 mg once daily to 1/2 tablet twice daily.   · She previously complained of her heart pounding hard (not fast) intermittent at night several times per week while in school but this resolved when school ended for the summer.   Dyslipidemia- elevated LDL and low HDL 6/2022  Prediabetes- A1c 5.7% 6/2022  Thyroid- Taking Synthroid 88 mcg daily as directed. She has noticed her hair texture was different for the last few months- started over the summer. She is also noticing being very hot when she is trying to get ready and has to have a fan in her bathroom. She continues with increased irritability. No other heat intolerance, diarrhea.   Vitamin D- taking vitamin D 5000 IU daily.    Taking vitamin C.     Moods- increased Celexa to 40 mg once daily- could not get out of bed. She went back to 20 mg and did well. She felt better. No longer severe mood swings.    · At previous visit, patient reported some mood changes with anxiety, crying, and feeling unlike herself.  She does not typically have mood changes and is pretty calm, so this was abnormal for her.    · She was given Celexa 10 mg at bedtime, picked up the medication, but did not start it.    · She worked on increasing exercise, mindfulness, and has settling into her new job and her daughter has settled into college.    · She then started Celexa 10 mg and has had improvement in moods, reporting she was glad she started it.   · She had 1 episode of dizziness on day 4 of medication, stopped the medication x 1 day, then resumed and has not had any recurrence or AE.    · 7/2021- She had increased irritability, mood swings, and feeling like moods are less controlled. She is short tempered. She has also had abnormal bleeding- with change in pattern and heaviness of menses.    · 9/2021- Increased Celexa to 20 mg- she had no worsening or AE but no improvement in irritability, agitation.  · Sister has taken something for moods. She     DUB- regular menses. Back to normal but only 3 days and done.   She was having very heavy menses. She bled through 3 days in a row. Usually not that heavy.   [facility] the [email or web address]. 10/2021- they cancelled.     The following portions of the patient's history were reviewed and updated as appropriate: allergies, current medications, past family history, past medical history, past social history, past surgical history and problem list.    Review of Systems   Constitutional: Negative.    HENT: Negative.    Respiratory: Negative.    Cardiovascular: Negative.    Gastrointestinal: Negative.    Endocrine: Positive for heat intolerance.   Genitourinary: Positive for menstrual problem.   Skin:        Hair texture change   Neurological: Negative.    Psychiatric/Behavioral: Agitation: improved. Dysphoric mood: improved. Nervous/anxious: improved.        Objective    Vitals:    10/14/22 0907   BP: 112/82    Pulse: 78   Resp: 16   Temp: 98 °F (36.7 °C)   SpO2: 99%      Body mass index is 35.17 kg/m².    Physical Exam   Constitutional: She is oriented to person, place, and time. She appears well-developed. No distress.   HENT:   Head: Normocephalic and atraumatic.   Right Ear: External ear normal.   Left Ear: External ear normal.   Nose: Nose normal.   Eyes: Conjunctivae and lids are normal. Right eye exhibits no discharge. Left eye exhibits no discharge.   Neck: Carotid bruit is not present.   Cardiovascular: Normal rate, regular rhythm, normal heart sounds and normal pulses. Exam reveals no gallop and no friction rub.   No murmur heard.  Pulmonary/Chest: Effort normal and breath sounds normal. No respiratory distress. She has no wheezes. She has no rhonchi. She has no rales.   Abdominal: Normal appearance.   Musculoskeletal: No deformity.   Neurological: She is alert and oriented to person, place, and time. Gait normal.   Skin: Skin is warm and dry.   Psychiatric: Her speech is normal and behavior is normal. Mood, judgment and thought content normal. She is attentive.   Nursing note and vitals reviewed.      Assessment & Plan   Diagnoses and all orders for this visit:    1. Benign hypertension (Primary)  -     Comprehensive Metabolic Panel  -     Urinalysis With Culture If Indicated -    2. Dyslipidemia  -     Comprehensive Metabolic Panel  -     CK  -     Lipid Panel With LDL / HDL Ratio    3. Prediabetes  -     CBC & Differential  -     Comprehensive Metabolic Panel  -     Hemoglobin A1c    4. Acquired hypothyroidism  -     CBC & Differential  -     TSH  -     T4, free  -     T3, Free    5. Vitamin D deficiency  -     Comprehensive Metabolic Panel  -     Vitamin D 25 Hydroxy    6. Mood swings    7. Anxiety    8. Dysfunctional uterine bleeding    Other orders  -     Microscopic Examination -           Assessment and Plan  Patient will have fasting labs. Call if no results in 1 week. Stability of conditions, plan,  follow up, and further recommendations pending labs. Follow up in 3-6 months pending results.       · Hypertension- Blood pressure is stable today. Continue lisinopril 10 mg 1/2 tablet twice daily and monitor blood pressures.   · Hypothyroidism- Thyroid was borderline 7/2021, and she was having some mood and menstrual concerns. She has also had some heat intolerance or trouble with being hot when getting ready. She then had slight improvement in Free T4 to the upper normal range, normal Free T3, and TSH is up to 2.75 9/2021. Continue Synthroid 88 mcg daily. Await labs for further recommendations.   · Elevated glucose- I will check A1C with labs today.   · Vitamin D deficiency- Last labs were stable. Continue vitamin D. Dosing recommendations pending labs.   · Anxiety- Moods were stable then had been more uncontrolled with irritability, mood swings, feeling hot when trying to get ready, and change in menses. Patient could be perimenopausal. We increased Celexa from 10 mg once daily to 20 mg once daily without improvement in moods then to 40 mg once daily. She did not tolerate increased dose and resumed 20 mg once daily. With decreased motivation, we will decrease again to 10 mg. She will call if she has worsening moods or no improvement with motivation.   · Consideration of changing to Effexor or Pristiq if uncontrolled moods, symptoms. She will find out what her sister did well with when she went through perimenopause/ menopause to consider that medication. She can also talk with GYN about symptoms and menses. To be seen if worsening moods or AE with medication.    · Menorrhagia, change in menses- Patient to contact her GYN for follow up and to discuss menstrual changes and symptoms.    I spent 30 minutes caring for Vashti Hair on this date of service. This time includes time spent by me in the following activities as necessary: preparing for the visit, reviewing tests, specialists records and previous visits,  obtaining and/or reviewing a separately obtained history, performing a medically appropriate exam and/or evaluation, counseling and educating the patient, family, caregiver, referring and/or communicating with other healthcare professionals, documenting information in the medical record, independently interpreting results and communicating that information with the patient, family, caregiver, and developing a medically appropriate treatment plan with consideration of other conditions, medications, and treatments.

## 2022-10-15 LAB
25(OH)D3+25(OH)D2 SERPL-MCNC: 39.2 NG/ML (ref 30–100)
ALBUMIN SERPL-MCNC: 4.3 G/DL (ref 3.5–5.2)
ALBUMIN/GLOB SERPL: 1.9 G/DL
ALP SERPL-CCNC: 51 U/L (ref 39–117)
ALT SERPL-CCNC: 16 U/L (ref 1–33)
APPEARANCE UR: CLEAR
AST SERPL-CCNC: 12 U/L (ref 1–32)
BACTERIA #/AREA URNS HPF: NORMAL /HPF
BASOPHILS # BLD AUTO: 0.05 10*3/MM3 (ref 0–0.2)
BASOPHILS NFR BLD AUTO: 0.6 % (ref 0–1.5)
BILIRUB SERPL-MCNC: 0.3 MG/DL (ref 0–1.2)
BILIRUB UR QL STRIP: NEGATIVE
BUN SERPL-MCNC: 12 MG/DL (ref 6–20)
BUN/CREAT SERPL: 15 (ref 7–25)
CALCIUM SERPL-MCNC: 9.3 MG/DL (ref 8.6–10.5)
CASTS URNS QL MICRO: NORMAL /LPF
CHLORIDE SERPL-SCNC: 102 MMOL/L (ref 98–107)
CHOLEST SERPL-MCNC: 162 MG/DL (ref 0–200)
CK SERPL-CCNC: 69 U/L (ref 20–180)
CO2 SERPL-SCNC: 24.4 MMOL/L (ref 22–29)
COLOR UR: YELLOW
CREAT SERPL-MCNC: 0.8 MG/DL (ref 0.57–1)
EGFRCR SERPLBLD CKD-EPI 2021: 91 ML/MIN/1.73
EOSINOPHIL # BLD AUTO: 0.2 10*3/MM3 (ref 0–0.4)
EOSINOPHIL NFR BLD AUTO: 2.4 % (ref 0.3–6.2)
EPI CELLS #/AREA URNS HPF: NORMAL /HPF (ref 0–10)
ERYTHROCYTE [DISTWIDTH] IN BLOOD BY AUTOMATED COUNT: 13.5 % (ref 12.3–15.4)
GLOBULIN SER CALC-MCNC: 2.3 GM/DL
GLUCOSE SERPL-MCNC: 90 MG/DL (ref 65–99)
GLUCOSE UR QL STRIP: NEGATIVE
HBA1C MFR BLD: 5.7 % (ref 4.8–5.6)
HCT VFR BLD AUTO: 43 % (ref 34–46.6)
HDLC SERPL-MCNC: 45 MG/DL (ref 40–60)
HGB BLD-MCNC: 14 G/DL (ref 12–15.9)
HGB UR QL STRIP: NEGATIVE
IMM GRANULOCYTES # BLD AUTO: 0.04 10*3/MM3 (ref 0–0.05)
IMM GRANULOCYTES NFR BLD AUTO: 0.5 % (ref 0–0.5)
KETONES UR QL STRIP: NEGATIVE
LDLC SERPL CALC-MCNC: 94 MG/DL (ref 0–100)
LDLC/HDLC SERPL: 2.03 {RATIO}
LEUKOCYTE ESTERASE UR QL STRIP: NEGATIVE
LYMPHOCYTES # BLD AUTO: 2.14 10*3/MM3 (ref 0.7–3.1)
LYMPHOCYTES NFR BLD AUTO: 26.1 % (ref 19.6–45.3)
MCH RBC QN AUTO: 29.1 PG (ref 26.6–33)
MCHC RBC AUTO-ENTMCNC: 32.6 G/DL (ref 31.5–35.7)
MCV RBC AUTO: 89.4 FL (ref 79–97)
MICRO URNS: NORMAL
MICRO URNS: NORMAL
MONOCYTES # BLD AUTO: 0.66 10*3/MM3 (ref 0.1–0.9)
MONOCYTES NFR BLD AUTO: 8 % (ref 5–12)
NEUTROPHILS # BLD AUTO: 5.12 10*3/MM3 (ref 1.7–7)
NEUTROPHILS NFR BLD AUTO: 62.4 % (ref 42.7–76)
NITRITE UR QL STRIP: NEGATIVE
NRBC BLD AUTO-RTO: 0 /100 WBC (ref 0–0.2)
PH UR STRIP: 5.5 [PH] (ref 5–7.5)
PLATELET # BLD AUTO: 342 10*3/MM3 (ref 140–450)
POTASSIUM SERPL-SCNC: 4.4 MMOL/L (ref 3.5–5.2)
PROT SERPL-MCNC: 6.6 G/DL (ref 6–8.5)
PROT UR QL STRIP: NEGATIVE
RBC # BLD AUTO: 4.81 10*6/MM3 (ref 3.77–5.28)
RBC #/AREA URNS HPF: NORMAL /HPF (ref 0–2)
SODIUM SERPL-SCNC: 136 MMOL/L (ref 136–145)
SP GR UR STRIP: 1.01 (ref 1–1.03)
T3FREE SERPL-MCNC: 3 PG/ML (ref 2–4.4)
T4 FREE SERPL-MCNC: 1.38 NG/DL (ref 0.93–1.7)
TRIGL SERPL-MCNC: 129 MG/DL (ref 0–150)
TSH SERPL DL<=0.005 MIU/L-ACNC: 2.1 UIU/ML (ref 0.27–4.2)
URINALYSIS REFLEX: NORMAL
UROBILINOGEN UR STRIP-MCNC: 0.2 MG/DL (ref 0.2–1)
VLDLC SERPL CALC-MCNC: 23 MG/DL (ref 5–40)
WBC # BLD AUTO: 8.21 10*3/MM3 (ref 3.4–10.8)
WBC #/AREA URNS HPF: NORMAL /HPF (ref 0–5)

## 2022-11-07 DIAGNOSIS — E03.9 ACQUIRED HYPOTHYROIDISM: ICD-10-CM

## 2022-11-07 RX ORDER — LEVOTHYROXINE SODIUM 88 UG/1
88 TABLET ORAL DAILY
Qty: 90 TABLET | Refills: 0 | Status: SHIPPED | OUTPATIENT
Start: 2022-11-07 | End: 2023-02-10

## 2023-01-17 DIAGNOSIS — I10 BENIGN HYPERTENSION: ICD-10-CM

## 2023-01-17 RX ORDER — LISINOPRIL 10 MG/1
TABLET ORAL
Qty: 90 TABLET | Refills: 0 | Status: SHIPPED | OUTPATIENT
Start: 2023-01-17

## 2023-01-27 ENCOUNTER — OFFICE VISIT (OUTPATIENT)
Dept: FAMILY MEDICINE CLINIC | Facility: CLINIC | Age: 48
End: 2023-01-27
Payer: COMMERCIAL

## 2023-01-27 VITALS
BODY MASS INDEX: 33.99 KG/M2 | WEIGHT: 180 LBS | HEART RATE: 78 BPM | HEIGHT: 61 IN | RESPIRATION RATE: 16 BRPM | OXYGEN SATURATION: 98 % | TEMPERATURE: 98 F | SYSTOLIC BLOOD PRESSURE: 122 MMHG | DIASTOLIC BLOOD PRESSURE: 76 MMHG

## 2023-01-27 DIAGNOSIS — I10 BENIGN HYPERTENSION: Primary | ICD-10-CM

## 2023-01-27 DIAGNOSIS — E55.9 VITAMIN D DEFICIENCY: ICD-10-CM

## 2023-01-27 DIAGNOSIS — E78.5 DYSLIPIDEMIA: ICD-10-CM

## 2023-01-27 DIAGNOSIS — E03.9 ACQUIRED HYPOTHYROIDISM: ICD-10-CM

## 2023-01-27 DIAGNOSIS — F41.9 ANXIETY: ICD-10-CM

## 2023-01-27 DIAGNOSIS — R45.86 MOOD SWINGS: ICD-10-CM

## 2023-01-27 DIAGNOSIS — R73.03 PREDIABETES: ICD-10-CM

## 2023-01-27 PROCEDURE — 99214 OFFICE O/P EST MOD 30 MIN: CPT | Performed by: PHYSICIAN ASSISTANT

## 2023-01-27 NOTE — PROGRESS NOTES
Subjective   Vashti Hair is a 47 y.o. female who presents today in follow-up of hypertension, hypothyroidism, vitamin D deficiency, and moods.    HPI     Kids are gone and not eating out as much.   Doing Weight Watchers for a month- she has not lost weight. Feeling better and thinks she is going to have better labs but she is not losing weight with changes. Only drinking water. Has actually lost 6 lbs.   Hypertension- BP has been better on twice daily dosing. No further lower BP that she is aware. Has not checked BP- she is taking Lisinopril 1/2 twice daily. No AE. Diastolic consistently 82-85 and systolic consistently 115.   · BP was lower in the morning and a little higher in the afternoon- averaging 105/70 in the morning and 130/80 in the afternoon.   · Previous blood pressure at her OBGYN was 130/90 in the afternoon and was low in office 12/2019 in the morning.    · She had no dizziness with standing and no signs of hypotension.    · We changed her Lisinopril 10 mg once daily to 1/2 tablet twice daily.   · She previously complained of her heart pounding hard (not fast) intermittent at night several times per week while in school but this resolved when school ended for the summer.   Dyslipidemia- elevated LDL and low HDL 6/2022  Prediabetes- A1c 5.7% 6/2022 and 10/2022  Thyroid- Taking Synthroid 88 mcg daily as directed.   · She previously noticed her hair texture was different for a few months- started over the summer. She noticed being very hot when she is trying to get ready and has to have a fan in her bathroom. She continued with increased irritability. No other heat intolerance, diarrhea.   Vitamin D- taking vitamin D 5000 IU daily.    Taking vitamin C.     Moods- feeling better on Celexa 10 mg- she may have some more feeling up tight but is able to deal with it. It is more like her, so she lux ok. She is doing better with this dosage.   · At previous visit, patient reported some mood changes with  anxiety, crying, and feeling unlike herself.  She does not typically have mood changes and is pretty calm, so this was abnormal for her.    · She was given Celexa 10 mg at bedtime, picked up the medication, but did not start it.    · She worked on increasing exercise, mindfulness, and has settling into her new job and her daughter has settled into college.    · She then started Celexa 10 mg and has had improvement in moods, reporting she was glad she started it.   · She had 1 episode of dizziness on day 4 of medication, stopped the medication x 1 day, then resumed and has not had any recurrence or AE.    · 7/2021- She had increased irritability, mood swings, and feeling like moods are less controlled. She is short tempered. She has also had abnormal bleeding- with change in pattern and heaviness of menses.    · 9/2021- Increased Celexa to 20 mg- she had no worsening or AE but no improvement in irritability, agitation.  · She increased Celexa to 40 mg once daily- could not get out of bed, so she went back to 20 mg and did well. She felt better. No longer severe mood swings.   · When moving 8/2022- she ran out for 1 week and was crying all the time and felt horrible. Felt better with medication but noticed decreased motivation that was really bothersome.  She was not motivated to exercise at all. She did not have these issues in the past.  · Sister has taken something for moods.     DUB- regular menses. Back to normal but only 3 days and done.   · She was having very heavy menses. She bled through 3 days in a row. Usually not that heavy.   · [facility] the [employer]. 10/2021- they cancelled.     The following portions of the patient's history were reviewed and updated as appropriate: allergies, current medications, past family history, past medical history, past social history, past surgical history and problem list.    Review of Systems   Constitutional: Negative.    HENT: Negative.    Respiratory: Negative.     Cardiovascular: Negative.    Gastrointestinal: Negative.    Endocrine: Positive for heat intolerance.   Genitourinary: Positive for menstrual problem.   Skin:        Hair texture change   Neurological: Negative.    Psychiatric/Behavioral: Agitation: improved. Dysphoric mood: improved. Nervous/anxious: improved.        Objective    Vitals:    01/27/23 0909   BP: 122/76   Pulse: 78   Resp: 16   Temp: 98 °F (36.7 °C)   SpO2: 98%      Body mass index is 34.03 kg/m².    Physical Exam   Constitutional: She is oriented to person, place, and time. She appears well-developed. No distress.   HENT:   Head: Normocephalic and atraumatic.   Right Ear: External ear normal.   Left Ear: External ear normal.   Nose: Nose normal.   Eyes: Conjunctivae and lids are normal. Right eye exhibits no discharge. Left eye exhibits no discharge.   Neck: Carotid bruit is not present.   Cardiovascular: Normal rate, regular rhythm, normal heart sounds and normal pulses. Exam reveals no gallop and no friction rub.   No murmur heard.  Pulmonary/Chest: Effort normal and breath sounds normal. No respiratory distress. She has no wheezes. She has no rhonchi. She has no rales.   Abdominal: Normal appearance.   Musculoskeletal: No deformity.   Neurological: She is alert and oriented to person, place, and time. Gait normal.   Skin: Skin is warm and dry.   Psychiatric: Her speech is normal and behavior is normal. Mood, judgment and thought content normal. She is attentive.   Nursing note and vitals reviewed.      Assessment & Plan   Diagnoses and all orders for this visit:    1. Benign hypertension (Primary)  -     Comprehensive Metabolic Panel    2. Dyslipidemia  -     Comprehensive Metabolic Panel  -     CK  -     Lipid Panel With LDL / HDL Ratio    3. Prediabetes  -     CBC & Differential  -     Comprehensive Metabolic Panel  -     Hemoglobin A1c    4. Acquired hypothyroidism  -     CBC & Differential  -     TSH  -     T4, free  -     T3, Free    5.  Vitamin D deficiency  -     Comprehensive Metabolic Panel  -     Vitamin D,25-Hydroxy    6. Mood swings  -     CBC & Differential    7. Anxiety  -     CBC & Differential           Assessment and Plan  Patient will have fasting labs. Call if no results in 1 week. Stability of conditions, plan, follow up, and further recommendations pending labs. Follow up in 3-6 months pending results.       · Hypertension- Blood pressure is stable today. Continue lisinopril 10 mg 1/2 tablet twice daily and monitor blood pressures.   · Hypothyroidism- Continue Synthroid 88 mcg daily. Await labs for further recommendations.   · Prediabetes- I will check A1C with labs today.  She is working diligently on diet with weight watchers but could increase exercise.  She is lost 6 pounds since her last visit but is frustrated that it is not coming off very quickly.  We could consider Wegovy pending labs if she would like to try this in addition to lifestyle change.  Patient will let me know if she would like to try this medication.  · Vitamin D deficiency- Last labs were stable. Continue vitamin D. Dosing recommendations pending labs.   · Anxiety- Moods were stable then had been more uncontrolled with irritability, mood swings, feeling hot when trying to get ready, and change in menses. Patient could be perimenopausal. We increased Celexa from 10 mg once daily to 20 mg once daily without improvement in moods then to 40 mg once daily. She did not tolerate increased dose and resumed 20 mg once daily. With decreased motivation, we decreased again to 10 mg.  She has had significant improvement in moods with decreasing again.  She will call if she has worsening moods or AE with medication.   · Consideration of changing to Effexor or Pristiq if uncontrolled moods, symptoms. She will find out what her sister did well with when she went through perimenopause/ menopause to consider that medication. She can also talk with GYN about symptoms and menses.  To be seen if worsening moods or AE with medication.    · Menorrhagia, change in menses- Patient to contact her GYN for follow up and to discuss any menstrual changes and symptoms.    I spent 30 minutes caring for Vashti Hair on this date of service. This time includes time spent by me in the following activities as necessary: preparing for the visit, reviewing tests, specialists records and previous visits, obtaining and/or reviewing a separately obtained history, performing a medically appropriate exam and/or evaluation, counseling and educating the patient, family, caregiver, referring and/or communicating with other healthcare professionals, documenting information in the medical record, independently interpreting results and communicating that information with the patient, family, caregiver, and developing a medically appropriate treatment plan with consideration of other conditions, medications, and treatments.

## 2023-01-28 LAB
25(OH)D3+25(OH)D2 SERPL-MCNC: 44.2 NG/ML (ref 30–100)
ALBUMIN SERPL-MCNC: 4.6 G/DL (ref 3.5–5.2)
ALBUMIN/GLOB SERPL: 1.8 G/DL
ALP SERPL-CCNC: 47 U/L (ref 39–117)
ALT SERPL-CCNC: 19 U/L (ref 1–33)
AST SERPL-CCNC: 18 U/L (ref 1–32)
BASOPHILS # BLD AUTO: 0.06 10*3/MM3 (ref 0–0.2)
BASOPHILS NFR BLD AUTO: 1.1 % (ref 0–1.5)
BILIRUB SERPL-MCNC: 0.4 MG/DL (ref 0–1.2)
BUN SERPL-MCNC: 13 MG/DL (ref 6–20)
BUN/CREAT SERPL: 16.5 (ref 7–25)
CALCIUM SERPL-MCNC: 9.6 MG/DL (ref 8.6–10.5)
CHLORIDE SERPL-SCNC: 105 MMOL/L (ref 98–107)
CHOLEST SERPL-MCNC: 157 MG/DL (ref 0–200)
CK SERPL-CCNC: 90 U/L (ref 20–180)
CO2 SERPL-SCNC: 25.1 MMOL/L (ref 22–29)
CREAT SERPL-MCNC: 0.79 MG/DL (ref 0.57–1)
EGFRCR SERPLBLD CKD-EPI 2021: 92.4 ML/MIN/1.73
EOSINOPHIL # BLD AUTO: 0.14 10*3/MM3 (ref 0–0.4)
EOSINOPHIL NFR BLD AUTO: 2.6 % (ref 0.3–6.2)
ERYTHROCYTE [DISTWIDTH] IN BLOOD BY AUTOMATED COUNT: 13 % (ref 12.3–15.4)
GLOBULIN SER CALC-MCNC: 2.5 GM/DL
GLUCOSE SERPL-MCNC: 101 MG/DL (ref 65–99)
HBA1C MFR BLD: 5.6 % (ref 4.8–5.6)
HCT VFR BLD AUTO: 41.9 % (ref 34–46.6)
HDLC SERPL-MCNC: 37 MG/DL (ref 40–60)
HGB BLD-MCNC: 13.8 G/DL (ref 12–15.9)
IMM GRANULOCYTES # BLD AUTO: 0.01 10*3/MM3 (ref 0–0.05)
IMM GRANULOCYTES NFR BLD AUTO: 0.2 % (ref 0–0.5)
LDLC SERPL CALC-MCNC: 101 MG/DL (ref 0–100)
LDLC/HDLC SERPL: 2.7 {RATIO}
LYMPHOCYTES # BLD AUTO: 1.71 10*3/MM3 (ref 0.7–3.1)
LYMPHOCYTES NFR BLD AUTO: 31.4 % (ref 19.6–45.3)
MCH RBC QN AUTO: 28.8 PG (ref 26.6–33)
MCHC RBC AUTO-ENTMCNC: 32.9 G/DL (ref 31.5–35.7)
MCV RBC AUTO: 87.5 FL (ref 79–97)
MONOCYTES # BLD AUTO: 0.44 10*3/MM3 (ref 0.1–0.9)
MONOCYTES NFR BLD AUTO: 8.1 % (ref 5–12)
NEUTROPHILS # BLD AUTO: 3.09 10*3/MM3 (ref 1.7–7)
NEUTROPHILS NFR BLD AUTO: 56.6 % (ref 42.7–76)
NRBC BLD AUTO-RTO: 0 /100 WBC (ref 0–0.2)
PLATELET # BLD AUTO: 361 10*3/MM3 (ref 140–450)
POTASSIUM SERPL-SCNC: 4.3 MMOL/L (ref 3.5–5.2)
PROT SERPL-MCNC: 7.1 G/DL (ref 6–8.5)
RBC # BLD AUTO: 4.79 10*6/MM3 (ref 3.77–5.28)
SODIUM SERPL-SCNC: 141 MMOL/L (ref 136–145)
T3FREE SERPL-MCNC: 3.1 PG/ML (ref 2–4.4)
T4 FREE SERPL-MCNC: 1.75 NG/DL (ref 0.93–1.7)
TRIGL SERPL-MCNC: 101 MG/DL (ref 0–150)
TSH SERPL DL<=0.005 MIU/L-ACNC: 1.49 UIU/ML (ref 0.27–4.2)
VLDLC SERPL CALC-MCNC: 19 MG/DL (ref 5–40)
WBC # BLD AUTO: 5.45 10*3/MM3 (ref 3.4–10.8)

## 2023-02-10 DIAGNOSIS — F41.9 ANXIETY: ICD-10-CM

## 2023-02-10 DIAGNOSIS — E03.9 ACQUIRED HYPOTHYROIDISM: ICD-10-CM

## 2023-02-10 RX ORDER — LEVOTHYROXINE SODIUM 88 UG/1
88 TABLET ORAL DAILY
Qty: 90 TABLET | Refills: 0 | Status: SHIPPED | OUTPATIENT
Start: 2023-02-10

## 2023-02-10 RX ORDER — CITALOPRAM 20 MG/1
20 TABLET ORAL DAILY
Qty: 90 TABLET | Refills: 0 | Status: SHIPPED | OUTPATIENT
Start: 2023-02-10

## 2023-04-12 ENCOUNTER — PATIENT MESSAGE (OUTPATIENT)
Dept: FAMILY MEDICINE CLINIC | Facility: CLINIC | Age: 48
End: 2023-04-12
Payer: COMMERCIAL

## 2023-04-12 DIAGNOSIS — E66.9 CLASS 1 OBESITY WITHOUT SERIOUS COMORBIDITY WITH BODY MASS INDEX (BMI) OF 34.0 TO 34.9 IN ADULT, UNSPECIFIED OBESITY TYPE: Primary | ICD-10-CM

## 2023-04-13 RX ORDER — SEMAGLUTIDE 0.25 MG/.5ML
0.25 INJECTION, SOLUTION SUBCUTANEOUS
Qty: 2 ML | Refills: 0 | Status: SHIPPED | OUTPATIENT
Start: 2023-04-13

## 2023-04-13 NOTE — TELEPHONE ENCOUNTER
From: Vashti Hair  To: Sushma Parra  Sent: 4/12/2023 6:54 PM EDT  Subject: Wagovy    Hi Sushma! At my last visit, we had discussed me trying the wagovy shots to help with weight loss and my blood sugar. I know my blood sugar came back lower which I am thankful for but was hoping I would still be eligible for the Wagovy shots for my weight. What are your thoughts about that? Thanks so much!  Zuly

## 2023-05-01 ENCOUNTER — OFFICE VISIT (OUTPATIENT)
Dept: FAMILY MEDICINE CLINIC | Facility: CLINIC | Age: 48
End: 2023-05-01
Payer: COMMERCIAL

## 2023-05-01 VITALS
DIASTOLIC BLOOD PRESSURE: 58 MMHG | WEIGHT: 187 LBS | RESPIRATION RATE: 14 BRPM | OXYGEN SATURATION: 99 % | TEMPERATURE: 98.3 F | HEIGHT: 61 IN | BODY MASS INDEX: 35.3 KG/M2 | HEART RATE: 80 BPM | SYSTOLIC BLOOD PRESSURE: 102 MMHG

## 2023-05-01 DIAGNOSIS — I10 BENIGN HYPERTENSION: Primary | ICD-10-CM

## 2023-05-01 DIAGNOSIS — F41.9 ANXIETY: ICD-10-CM

## 2023-05-01 DIAGNOSIS — E78.5 DYSLIPIDEMIA: ICD-10-CM

## 2023-05-01 DIAGNOSIS — Z11.59 NEED FOR HEPATITIS C SCREENING TEST: ICD-10-CM

## 2023-05-01 DIAGNOSIS — Z12.11 COLON CANCER SCREENING: ICD-10-CM

## 2023-05-01 DIAGNOSIS — R45.86 MOOD SWINGS: ICD-10-CM

## 2023-05-01 DIAGNOSIS — E66.9 CLASS 1 OBESITY WITHOUT SERIOUS COMORBIDITY WITH BODY MASS INDEX (BMI) OF 34.0 TO 34.9 IN ADULT, UNSPECIFIED OBESITY TYPE: ICD-10-CM

## 2023-05-01 DIAGNOSIS — R73.03 PREDIABETES: ICD-10-CM

## 2023-05-01 DIAGNOSIS — E55.9 VITAMIN D DEFICIENCY: ICD-10-CM

## 2023-05-01 DIAGNOSIS — E03.9 ACQUIRED HYPOTHYROIDISM: ICD-10-CM

## 2023-05-01 PROCEDURE — 99214 OFFICE O/P EST MOD 30 MIN: CPT | Performed by: PHYSICIAN ASSISTANT

## 2023-05-01 RX ORDER — CITALOPRAM 10 MG/1
10 TABLET ORAL DAILY
Qty: 90 TABLET | Refills: 1 | Status: SHIPPED | OUTPATIENT
Start: 2023-05-01

## 2023-05-01 RX ORDER — CITALOPRAM 20 MG/1
20 TABLET ORAL DAILY
Qty: 90 TABLET | Refills: 0 | Status: SHIPPED | OUTPATIENT
Start: 2023-05-01 | End: 2023-05-01 | Stop reason: SDUPTHER

## 2023-05-01 RX ORDER — LISINOPRIL 5 MG/1
5 TABLET ORAL DAILY
Qty: 90 TABLET | Refills: 1 | Status: SHIPPED | OUTPATIENT
Start: 2023-05-01

## 2023-05-01 NOTE — PROGRESS NOTES
Subjective   Vashti Hair is a 48 y.o. female who presents today in follow-up of hypertension, dyslipidemia, prediabetes, hypothyroidism, vitamin D deficiency, and moods.    HPI   Answers for HPI/ROS submitted by the patient on 5/1/2023  Please describe your symptoms.: 3 month revisit- no symptoms- just blood work for thyroid and sugar- talk about Wegovy  Have you had these symptoms before?: Yes  How long have you been having these symptoms?: 1-4 days  Please describe any probable cause for these symptoms. : N/A  What is the primary reason for your visit?: Other      Started having headaches- once monthly- distinct headache right before starting menses. Head hurts all over, nausea, photophobia. Reminds of when was pregnant and had headache but that wave of nausea. Usually waking up with it. She has heavier menses- was 3 days and lighter and now longer and heavier.     Kids are gone and not eating out as much.   Doing Weight Watchers for a month- she has not lost weight. Feeling better and thinks she is going to have better labs but she is not losing weight with changes. Only drinking water. She lost 6 lbs.   Has not started Wegovy. Was nervous.   Hypertension- BP has been better on twice daily dosing. No further lower BP that she is aware. Has not checked BP- she is taking Lisinopril 1/2 twice daily. No AE. Diastolic consistently 82-85 and systolic consistently 115.   · BP was lower in the morning and a little higher in the afternoon- averaging 105/70 in the morning and 130/80 in the afternoon.   · Previous blood pressure at her [facility] was 130/90 in the afternoon and was low in office 12/2019 in the morning.    · She had no dizziness with standing and no signs of hypotension.    · We changed her Lisinopril 10 mg once daily to 1/2 tablet twice daily.   · She previously complained of her heart pounding hard (not fast) intermittent at night several times per week while in school but this resolved when school ended for  the summer.   Dyslipidemia- elevated LDL and low HDL 6/2022  Prediabetes- A1c 5.7% 6/2022 and 10/2022  Thyroid- Taking Synthroid 88 mcg daily as directed.   · She previously noticed her hair texture was different for a few months- started over the summer. She noticed being very hot when she is trying to get ready and has to have a fan in her bathroom. She continued with increased irritability. No other heat intolerance, diarrhea.   Vitamin D- taking vitamin D 5000 IU daily.    Taking vitamin C.     Moods- feeling better on Celexa 10 mg- she may have some more feeling up tight but is able to deal with it. It is more like her, so she lux ok. She is doing better with this dosage.   · At previous visit, patient reported some mood changes with anxiety, crying, and feeling unlike herself.  She does not typically have mood changes and is pretty calm, so this was abnormal for her.    · She was given Celexa 10 mg at bedtime, picked up the medication, but did not start it.    · She worked on increasing exercise, mindfulness, and has settling into her new job and her daughter has settled into college.    · She then started Celexa 10 mg and has had improvement in moods, reporting she was glad she started it.   · She had 1 episode of dizziness on day 4 of medication, stopped the medication x 1 day, then resumed and has not had any recurrence or AE.    · 7/2021- She had increased irritability, mood swings, and feeling like moods are less controlled. She is short tempered. She has also had abnormal bleeding- with change in pattern and heaviness of menses.    · 9/2021- Increased Celexa to 20 mg- she had no worsening or AE but no improvement in irritability, agitation.  · She increased Celexa to 40 mg once daily- could not get out of bed, so she went back to 20 mg and did well. She felt better. No longer severe mood swings.   · When moving 8/2022- she ran out for 1 week and was crying all the time and felt horrible. Felt better  with medication but noticed decreased motivation that was really bothersome.  She was not motivated to exercise at all. She did not have these issues in the past.  · Sister has taken something for moods.     DUB- regular menses. Back to normal but only 3 days and done.   · She was having very heavy menses. She bled through 3 days in a row. Usually not that heavy.   · [facility]. 10/2021- they cancelled.     The following portions of the patient's history were reviewed and updated as appropriate: allergies, current medications, past family history, past medical history, past social history, past surgical history and problem list.    Review of Systems   Constitutional: Negative.    HENT: Negative.    Respiratory: Negative.    Cardiovascular: Negative.    Gastrointestinal: Negative.    Endocrine: Positive for heat intolerance.   Genitourinary: Positive for menstrual problem.   Skin:        Hair texture change   Neurological: Negative.    Psychiatric/Behavioral: Agitation: improved. Dysphoric mood: improved. Nervous/anxious: improved.        Objective    Vitals:    05/01/23 0901   BP: 102/58   Pulse: 80   Resp: 14   Temp: 98.3 °F (36.8 °C)   SpO2: 99%      Body mass index is 35.35 kg/m².    Physical Exam   Constitutional: She is oriented to person, place, and time. She appears well-developed. No distress.   HENT:   Head: Normocephalic and atraumatic.   Right Ear: External ear normal.   Left Ear: External ear normal.   Nose: Nose normal.   Eyes: Conjunctivae and lids are normal. Right eye exhibits no discharge. Left eye exhibits no discharge.   Neck: Carotid bruit is not present.   Cardiovascular: Normal rate, regular rhythm, normal heart sounds and normal pulses. Exam reveals no gallop and no friction rub.   No murmur heard.  Pulmonary/Chest: Effort normal and breath sounds normal. No respiratory distress. She has no wheezes. She has no rhonchi. She has no rales.   Abdominal: Normal appearance.    Musculoskeletal: No deformity.   Neurological: She is alert and oriented to person, place, and time. Gait normal.   Skin: Skin is warm and dry.   Psychiatric: Her speech is normal and behavior is normal. Mood, judgment and thought content normal. She is attentive.   Nursing note and vitals reviewed.      Assessment & Plan   Diagnoses and all orders for this visit:    1. Benign hypertension (Primary)  -     Comprehensive Metabolic Panel  -     lisinopril (PRINIVIL,ZESTRIL) 5 MG tablet; Take 1 tablet by mouth Daily.  Dispense: 90 tablet; Refill: 1    2. Dyslipidemia  -     Comprehensive Metabolic Panel  -     CK  -     Lipid Panel With LDL / HDL Ratio    3. Prediabetes  -     Comprehensive Metabolic Panel  -     Hemoglobin A1c    4. Acquired hypothyroidism  -     CBC & Differential  -     TSH  -     T4, free  -     T3, Free    5. Vitamin D deficiency  -     Comprehensive Metabolic Panel  -     Vitamin D,25-Hydroxy    6. Anxiety  -     Discontinue: citalopram (CeleXA) 20 MG tablet; Take 1 tablet by mouth Daily.  Dispense: 90 tablet; Refill: 0  -     citalopram (CeleXA) 10 MG tablet; Take 1 tablet by mouth Daily.  Dispense: 90 tablet; Refill: 1    7. Mood swings    8. Class 1 obesity without serious comorbidity with body mass index (BMI) of 34.0 to 34.9 in adult, unspecified obesity type  -     Comprehensive Metabolic Panel  -     Hemoglobin A1c  -     Lipid Panel With LDL / HDL Ratio  -     TSH  -     T4, free  -     T3, Free    9. Need for hepatitis C screening test  -     Hepatitis C antibody    10. Colon cancer screening  -     Ambulatory Referral For Screening Colonoscopy           Assessment and Plan  Patient will have fasting labs. Call if no results in 1 week. Stability of conditions, plan, follow up, and further recommendations pending labs. Follow up in 3-6 months pending results.       · Hypertension- Blood pressure is stable today. Continue lisinopril 10 mg 1/2 tablet twice daily and monitor blood  pressures.   · Hypothyroidism- Continue Synthroid 88 mcg daily. Await labs for further recommendations.   · Prediabetes- I will check A1C with labs today.  She is working diligently on diet with weight watchers but could increase exercise.  She is lost 6 pounds since her last visit but is frustrated that it is not coming off very quickly.  We could consider Wegovy pending labs if she would like to try this in addition to lifestyle change.  Patient will let me know if she would like to try this medication.  · Vitamin D deficiency- Last labs were stable. Continue vitamin D. Dosing recommendations pending labs.   · Anxiety- Moods were stable then had been more uncontrolled with irritability, mood swings, feeling hot when trying to get ready, and change in menses. Patient could be perimenopausal. We increased Celexa from 10 mg once daily to 20 mg once daily without improvement in moods then to 40 mg once daily. She did not tolerate increased dose and resumed 20 mg once daily. With decreased motivation, we decreased again to 10 mg.  She has had significant improvement in moods with decreasing again.  She will call if she has worsening moods or AE with medication.   · Consideration of changing to Effexor or Pristiq if uncontrolled moods, symptoms. She will find out what her sister did well with when she went through perimenopause/ menopause to consider that medication. She can also talk with GYN about symptoms and menses. To be seen if worsening moods or AE with medication.    · Menorrhagia, change in menses- Patient to contact her GYN for follow up and to discuss any menstrual changes and symptoms.    I spent 30 minutes caring for Vashti Hair on this date of service. This time includes time spent by me in the following activities as necessary: preparing for the visit, reviewing tests, specialists records and previous visits, obtaining and/or reviewing a separately obtained history, performing a medically appropriate  exam and/or evaluation, counseling and educating the patient, family, caregiver, referring and/or communicating with other healthcare professionals, documenting information in the medical record, independently interpreting results and communicating that information with the patient, family, caregiver, and developing a medically appropriate treatment plan with consideration of other conditions, medications, and treatments.

## 2023-05-15 DIAGNOSIS — E03.9 ACQUIRED HYPOTHYROIDISM: ICD-10-CM

## 2023-05-16 RX ORDER — LEVOTHYROXINE SODIUM 88 UG/1
88 TABLET ORAL DAILY
Qty: 90 TABLET | Refills: 0 | Status: SHIPPED | OUTPATIENT
Start: 2023-05-16

## 2023-08-14 ENCOUNTER — OFFICE VISIT (OUTPATIENT)
Dept: FAMILY MEDICINE CLINIC | Facility: CLINIC | Age: 48
End: 2023-08-14
Payer: COMMERCIAL

## 2023-08-14 VITALS
SYSTOLIC BLOOD PRESSURE: 112 MMHG | OXYGEN SATURATION: 98 % | TEMPERATURE: 98.3 F | RESPIRATION RATE: 16 BRPM | BODY MASS INDEX: 35.87 KG/M2 | HEART RATE: 74 BPM | WEIGHT: 190 LBS | HEIGHT: 61 IN | DIASTOLIC BLOOD PRESSURE: 60 MMHG

## 2023-08-14 DIAGNOSIS — E78.5 DYSLIPIDEMIA: ICD-10-CM

## 2023-08-14 DIAGNOSIS — I10 BENIGN HYPERTENSION: Primary | ICD-10-CM

## 2023-08-14 DIAGNOSIS — E55.9 VITAMIN D DEFICIENCY: ICD-10-CM

## 2023-08-14 DIAGNOSIS — R73.03 PREDIABETES: ICD-10-CM

## 2023-08-14 DIAGNOSIS — F41.9 ANXIETY: ICD-10-CM

## 2023-08-14 DIAGNOSIS — N93.8 DYSFUNCTIONAL UTERINE BLEEDING: ICD-10-CM

## 2023-08-14 DIAGNOSIS — R45.86 MOOD SWINGS: ICD-10-CM

## 2023-08-14 DIAGNOSIS — E03.9 ACQUIRED HYPOTHYROIDISM: ICD-10-CM

## 2023-08-14 PROCEDURE — 99214 OFFICE O/P EST MOD 30 MIN: CPT | Performed by: PHYSICIAN ASSISTANT

## 2023-08-14 NOTE — PROGRESS NOTES
Subjective   Vashti Hair is a 48 y.o. female who presents today in follow-up of hypertension, dyslipidemia, prediabetes, hypothyroidism, vitamin D deficiency, and moods.    Hypertension          Kids are gone and not eating out as much and they were home for a while and now back in college.   She was doing Weight Watchers for a month and had not lost weight.  She was only drinking water. She lost 6 lbs.   Has not started Wegovy. Was nervous.   Hypertension- BP has been better on twice daily dosing. No further lower BP that she is aware. Has not checked BP- she is taking Lisinopril 1 twice daily. No AE. No symptoms but has not checked at home.   BP was lower in the morning and a little higher in the afternoon- averaging 105/70 in the morning and 130/80 in the afternoon.   Previous blood pressure at her [facility] was 130/90 in the afternoon and was low in office 12/2019 in the morning.    She had no dizziness with standing and no signs of hypotension.    We changed her Lisinopril 10 mg once daily to 1/2 tablet twice daily.   She previously complained of her heart pounding hard (not fast) intermittent at night several times per week while in school but this resolved when school ended for the summer.   Dyslipidemia- elevated LDL and low HDL 6/2022  Prediabetes- A1c 5.7% 6/2022 and 10/2022  Thyroid- Taking Synthroid 88 mcg daily as directed.   She previously noticed her hair texture was different for a few months- started over the summer. She noticed being very hot when she is trying to get ready and has to have a fan in her bathroom. She continued with increased irritability. No other heat intolerance, diarrhea.   Vitamin D- taking vitamin D 5000 IU daily.    Taking vitamin C.     Moods- feeling better on Celexa 10 mg- she may have some more feeling up tight but is able to deal with it. It is more like her, so she lux ok. She is doing better with this dosage.   At previous visit, patient reported some mood changes with  anxiety, crying, and feeling unlike herself.  She does not typically have mood changes and is pretty calm, so this was abnormal for her.    She was given Celexa 10 mg at bedtime, picked up the medication, but did not start it.    She worked on increasing exercise, mindfulness, and has settling into her new job and her daughter has settled into college.    She then started Celexa 10 mg and has had improvement in moods, reporting she was glad she started it.   She had 1 episode of dizziness on day 4 of medication, stopped the medication x 1 day, then resumed and has not had any recurrence or AE.    7/2021- She had increased irritability, mood swings, and feeling like moods are less controlled. She is short tempered. She has also had abnormal bleeding- with change in pattern and heaviness of menses.    9/2021- Increased Celexa to 20 mg- she had no worsening or AE but no improvement in irritability, agitation.  She increased Celexa to 40 mg once daily- could not get out of bed, so she went back to 20 mg and did well. She felt better. No longer severe mood swings.   When moving 8/2022- she ran out for 1 week and was crying all the time and felt horrible. Felt better with medication but noticed decreased motivation that was really bothersome.  She was not motivated to exercise at all. She did not have these issues in the past.  Sister has taken something for moods.     Headaches- has does well over the summer. Doing ok.   She started having headaches- once monthly- distinct headache right before starting menses. Head hurt all over, nausea, photophobia.  It reminded her of when was pregnant and had headache but that wave of nausea.  She was waking up with it.     DUB- regular menses. Back to normal but only 3 days and done. She had heavier menses- was 3 days and lighter and now longer and heavier.  She was having very heavy menses. She bled through 3 days in a row. Usually not that heavy.   [facility] [employer]. 10/2021-  they cancelled.     The following portions of the patient's history were reviewed and updated as appropriate: allergies, current medications, past family history, past medical history, past social history, past surgical history and problem list.    Review of Systems   Constitutional: Negative.    HENT: Negative.     Respiratory: Negative.     Cardiovascular: Negative.    Gastrointestinal: Negative.    Endocrine: Positive for heat intolerance.   Genitourinary:  Positive for menstrual problem.   Skin:         Hair texture change   Neurological: Negative.    Psychiatric/Behavioral:  Agitation: improved. Dysphoric mood: improved. Nervous/anxious: improved.      Objective    Vitals:    08/14/23 1000   BP: 112/60   Pulse: 74   Resp: 16   Temp: 98.3 øF (36.8 øC)   SpO2: 98%      Body mass index is 35.92 kg/mý.    Physical Exam   Constitutional: She is oriented to person, place, and time. She appears well-developed. No distress.   HENT:   Head: Normocephalic and atraumatic.   Right Ear: External ear normal.   Left Ear: External ear normal.   Nose: Nose normal.   Eyes: Conjunctivae and lids are normal. Right eye exhibits no discharge. Left eye exhibits no discharge.   Neck: Carotid bruit is not present.   Cardiovascular: Normal rate, regular rhythm, normal heart sounds and normal pulses. Exam reveals no gallop and no friction rub.   No murmur heard.  Pulmonary/Chest: Effort normal and breath sounds normal. No respiratory distress. She has no wheezes. She has no rhonchi. She has no rales.   Abdominal: Normal appearance.   Musculoskeletal: No deformity.   Neurological: She is alert and oriented to person, place, and time. Gait normal.   Skin: Skin is warm and dry.   Psychiatric: Her speech is normal and behavior is normal. Mood, judgment and thought content normal. She is attentive.   Nursing note and vitals reviewed.    Assessment & Plan   Diagnoses and all orders for this visit:    1. Benign hypertension (Primary)    2.  Dyslipidemia    3. Prediabetes    4. Acquired hypothyroidism    5. Vitamin D deficiency    6. Anxiety    7. Mood swings    8. Dysfunctional uterine bleeding             Assessment and Plan  Patient will have fasting labs. Call if no results in 1 week. Stability of conditions, plan, follow up, and further recommendations pending labs. Follow up in 3-6 months pending results.       Hypertension- Blood pressure is stable today. Continue lisinopril 5 mg twice daily and monitor blood pressures.   Dyslipidemia-Await labs for further recommendations  Prediabetes- I will check A1C with labs today.  She is working diligently on diet with weight watchers but could increase exercise.  She is lost 6 pounds since her last visit but is frustrated that it is not coming off very quickly.  We could consider Wegovy pending labs if she would like to try this in addition to lifestyle change.  Patient will let me know if she would like to try this medication.  Hypothyroidism- Continue Synthroid 88 mcg daily. Await labs for further recommendations.   Vitamin D deficiency- Last labs were stable. Continue vitamin D. Dosing recommendations pending labs.   Anxiety- Moods were stable then had been more uncontrolled with irritability, mood swings, feeling hot when trying to get ready, and change in menses. Patient could be perimenopausal. We increased Celexa from 10 mg once daily to 20 mg once daily without improvement in moods then to 40 mg once daily. She did not tolerate increased dose and resumed 20 mg once daily. With decreased motivation, we decreased again to 10 mg.  She has had significant improvement in moods with decreasing again.  She will call if she has worsening moods or AE with medication.   Consideration of changing to Effexor or Pristiq if uncontrolled moods, symptoms. She will find out what her sister did well with when she went through perimenopause/ menopause to consider that medication. She can also talk with GYN about  symptoms and menses. To be seen if worsening moods or AE with medication.    Headache-She should be seen if she has persistent, recurrence, worsening, new or changing symptoms  Menorrhagia, change in menses- Patient to contact her GYN for follow up and to discuss any menstrual changes and symptoms.    I spent 30 minutes caring for Vashti Hair on this date of service. This time includes time spent by me in the following activities as necessary: preparing for the visit, reviewing tests, specialists records and previous visits, obtaining and/or reviewing a separately obtained history, performing a medically appropriate exam and/or evaluation, counseling and educating the patient, family, caregiver, referring and/or communicating with other healthcare professionals, documenting information in the medical record, independently interpreting results and communicating that information with the patient, family, caregiver, and developing a medically appropriate treatment plan with consideration of other conditions, medications, and treatments.              Answers submitted by the patient for this visit:  Other (Submitted on 8/14/2023)  Please describe your symptoms.: 3-4 month check in for high blood pressure and thyroid and borderline diabetes (blood work)  Have you had these symptoms before?: Yes  How long have you been having these symptoms?: Greater than 2 weeks  Please list any medications you are currently taking for this condition.: Levythroxine, Lisinopril  Please describe any probable cause for these symptoms. : Ongoing  Primary Reason for Visit (Submitted on 8/14/2023)  What is the primary reason for your visit?: Other

## 2023-08-15 LAB
25(OH)D3+25(OH)D2 SERPL-MCNC: 57.9 NG/ML (ref 30–100)
ALBUMIN SERPL-MCNC: 4.7 G/DL (ref 3.5–5.2)
ALBUMIN/GLOB SERPL: 1.9 G/DL
ALP SERPL-CCNC: 58 U/L (ref 39–117)
ALT SERPL-CCNC: 20 U/L (ref 1–33)
AST SERPL-CCNC: 16 U/L (ref 1–32)
BASOPHILS # BLD AUTO: 0.07 10*3/MM3 (ref 0–0.2)
BASOPHILS NFR BLD AUTO: 0.8 % (ref 0–1.5)
BILIRUB SERPL-MCNC: 0.3 MG/DL (ref 0–1.2)
BUN SERPL-MCNC: 18 MG/DL (ref 6–20)
BUN/CREAT SERPL: 21.4 (ref 7–25)
CALCIUM SERPL-MCNC: 9.8 MG/DL (ref 8.6–10.5)
CHLORIDE SERPL-SCNC: 101 MMOL/L (ref 98–107)
CHOLEST SERPL-MCNC: 182 MG/DL (ref 0–200)
CK SERPL-CCNC: 98 U/L (ref 20–180)
CO2 SERPL-SCNC: 25.2 MMOL/L (ref 22–29)
CREAT SERPL-MCNC: 0.84 MG/DL (ref 0.57–1)
EGFRCR SERPLBLD CKD-EPI 2021: 85.3 ML/MIN/1.73
EOSINOPHIL # BLD AUTO: 0.17 10*3/MM3 (ref 0–0.4)
EOSINOPHIL NFR BLD AUTO: 2 % (ref 0.3–6.2)
ERYTHROCYTE [DISTWIDTH] IN BLOOD BY AUTOMATED COUNT: 13.6 % (ref 12.3–15.4)
GLOBULIN SER CALC-MCNC: 2.5 GM/DL
GLUCOSE SERPL-MCNC: 99 MG/DL (ref 65–99)
HBA1C MFR BLD: 5.7 % (ref 4.8–5.6)
HCT VFR BLD AUTO: 42.4 % (ref 34–46.6)
HCV IGG SERPL QL IA: NON REACTIVE
HDLC SERPL-MCNC: 44 MG/DL (ref 40–60)
HGB BLD-MCNC: 13.9 G/DL (ref 12–15.9)
IMM GRANULOCYTES # BLD AUTO: 0.06 10*3/MM3 (ref 0–0.05)
IMM GRANULOCYTES NFR BLD AUTO: 0.7 % (ref 0–0.5)
LDLC SERPL CALC-MCNC: 112 MG/DL (ref 0–100)
LDLC/HDLC SERPL: 2.47 {RATIO}
LYMPHOCYTES # BLD AUTO: 1.99 10*3/MM3 (ref 0.7–3.1)
LYMPHOCYTES NFR BLD AUTO: 23.4 % (ref 19.6–45.3)
MCH RBC QN AUTO: 28.2 PG (ref 26.6–33)
MCHC RBC AUTO-ENTMCNC: 32.8 G/DL (ref 31.5–35.7)
MCV RBC AUTO: 86 FL (ref 79–97)
MONOCYTES # BLD AUTO: 0.55 10*3/MM3 (ref 0.1–0.9)
MONOCYTES NFR BLD AUTO: 6.5 % (ref 5–12)
NEUTROPHILS # BLD AUTO: 5.68 10*3/MM3 (ref 1.7–7)
NEUTROPHILS NFR BLD AUTO: 66.6 % (ref 42.7–76)
NRBC BLD AUTO-RTO: 0 /100 WBC (ref 0–0.2)
PLATELET # BLD AUTO: 424 10*3/MM3 (ref 140–450)
POTASSIUM SERPL-SCNC: 4.6 MMOL/L (ref 3.5–5.2)
PROT SERPL-MCNC: 7.2 G/DL (ref 6–8.5)
RBC # BLD AUTO: 4.93 10*6/MM3 (ref 3.77–5.28)
SODIUM SERPL-SCNC: 137 MMOL/L (ref 136–145)
T3FREE SERPL-MCNC: 2.8 PG/ML (ref 2–4.4)
T4 FREE SERPL-MCNC: 1.24 NG/DL (ref 0.93–1.7)
TRIGL SERPL-MCNC: 146 MG/DL (ref 0–150)
TSH SERPL DL<=0.005 MIU/L-ACNC: 2.58 UIU/ML (ref 0.27–4.2)
VLDLC SERPL CALC-MCNC: 26 MG/DL (ref 5–40)
WBC # BLD AUTO: 8.52 10*3/MM3 (ref 3.4–10.8)

## 2023-08-31 DIAGNOSIS — E03.9 ACQUIRED HYPOTHYROIDISM: ICD-10-CM

## 2023-09-01 RX ORDER — LEVOTHYROXINE SODIUM 88 UG/1
88 TABLET ORAL DAILY
Qty: 90 TABLET | Refills: 1 | Status: SHIPPED | OUTPATIENT
Start: 2023-09-01

## 2023-11-09 DIAGNOSIS — R73.03 PREDIABETES: ICD-10-CM

## 2023-11-09 DIAGNOSIS — E78.5 DYSLIPIDEMIA: ICD-10-CM

## 2023-11-09 DIAGNOSIS — E03.9 ACQUIRED HYPOTHYROIDISM: ICD-10-CM

## 2023-11-09 DIAGNOSIS — I10 BENIGN HYPERTENSION: Primary | ICD-10-CM

## 2023-11-09 DIAGNOSIS — E55.9 VITAMIN D DEFICIENCY: ICD-10-CM

## 2023-11-10 LAB
25(OH)D3+25(OH)D2 SERPL-MCNC: 48.9 NG/ML (ref 30–100)
ALBUMIN SERPL-MCNC: 4.6 G/DL (ref 3.5–5.2)
ALBUMIN/GLOB SERPL: 2.1 G/DL
ALP SERPL-CCNC: 52 U/L (ref 39–117)
ALT SERPL-CCNC: 20 U/L (ref 1–33)
AST SERPL-CCNC: 17 U/L (ref 1–32)
BASOPHILS # BLD AUTO: 0.07 10*3/MM3 (ref 0–0.2)
BASOPHILS NFR BLD AUTO: 1.1 % (ref 0–1.5)
BILIRUB SERPL-MCNC: <0.2 MG/DL (ref 0–1.2)
BUN SERPL-MCNC: 19 MG/DL (ref 6–20)
BUN/CREAT SERPL: 21.3 (ref 7–25)
CALCIUM SERPL-MCNC: 9.8 MG/DL (ref 8.6–10.5)
CHLORIDE SERPL-SCNC: 107 MMOL/L (ref 98–107)
CHOLEST SERPL-MCNC: 155 MG/DL (ref 0–200)
CK SERPL-CCNC: 82 U/L (ref 20–180)
CO2 SERPL-SCNC: 24.2 MMOL/L (ref 22–29)
CREAT SERPL-MCNC: 0.89 MG/DL (ref 0.57–1)
EGFRCR SERPLBLD CKD-EPI 2021: 79.6 ML/MIN/1.73
EOSINOPHIL # BLD AUTO: 0.18 10*3/MM3 (ref 0–0.4)
EOSINOPHIL NFR BLD AUTO: 2.9 % (ref 0.3–6.2)
ERYTHROCYTE [DISTWIDTH] IN BLOOD BY AUTOMATED COUNT: 13.8 % (ref 12.3–15.4)
GLOBULIN SER CALC-MCNC: 2.2 GM/DL
GLUCOSE SERPL-MCNC: 110 MG/DL (ref 65–99)
HBA1C MFR BLD: 5.7 % (ref 4.8–5.6)
HCT VFR BLD AUTO: 41.3 % (ref 34–46.6)
HDLC SERPL-MCNC: 41 MG/DL (ref 40–60)
HGB BLD-MCNC: 13.5 G/DL (ref 12–15.9)
IMM GRANULOCYTES # BLD AUTO: 0.02 10*3/MM3 (ref 0–0.05)
IMM GRANULOCYTES NFR BLD AUTO: 0.3 % (ref 0–0.5)
LDLC SERPL CALC-MCNC: 91 MG/DL (ref 0–100)
LDLC/HDLC SERPL: 2.15 {RATIO}
LYMPHOCYTES # BLD AUTO: 1.82 10*3/MM3 (ref 0.7–3.1)
LYMPHOCYTES NFR BLD AUTO: 29.3 % (ref 19.6–45.3)
MCH RBC QN AUTO: 29 PG (ref 26.6–33)
MCHC RBC AUTO-ENTMCNC: 32.7 G/DL (ref 31.5–35.7)
MCV RBC AUTO: 88.8 FL (ref 79–97)
MONOCYTES # BLD AUTO: 0.55 10*3/MM3 (ref 0.1–0.9)
MONOCYTES NFR BLD AUTO: 8.9 % (ref 5–12)
NEUTROPHILS # BLD AUTO: 3.57 10*3/MM3 (ref 1.7–7)
NEUTROPHILS NFR BLD AUTO: 57.5 % (ref 42.7–76)
NRBC BLD AUTO-RTO: 0 /100 WBC (ref 0–0.2)
PLATELET # BLD AUTO: 330 10*3/MM3 (ref 140–450)
POTASSIUM SERPL-SCNC: 4.6 MMOL/L (ref 3.5–5.2)
PROT SERPL-MCNC: 6.8 G/DL (ref 6–8.5)
RBC # BLD AUTO: 4.65 10*6/MM3 (ref 3.77–5.28)
SODIUM SERPL-SCNC: 142 MMOL/L (ref 136–145)
T3FREE SERPL-MCNC: 3 PG/ML (ref 2–4.4)
T4 FREE SERPL-MCNC: 1.31 NG/DL (ref 0.93–1.7)
TRIGL SERPL-MCNC: 130 MG/DL (ref 0–150)
TSH SERPL DL<=0.005 MIU/L-ACNC: 1.49 UIU/ML (ref 0.27–4.2)
VLDLC SERPL CALC-MCNC: 23 MG/DL (ref 5–40)
WBC # BLD AUTO: 6.21 10*3/MM3 (ref 3.4–10.8)

## 2023-11-17 ENCOUNTER — OFFICE VISIT (OUTPATIENT)
Dept: FAMILY MEDICINE CLINIC | Facility: CLINIC | Age: 48
End: 2023-11-17
Payer: COMMERCIAL

## 2023-11-17 VITALS
HEART RATE: 87 BPM | SYSTOLIC BLOOD PRESSURE: 118 MMHG | WEIGHT: 188 LBS | BODY MASS INDEX: 35.5 KG/M2 | TEMPERATURE: 98.9 F | OXYGEN SATURATION: 99 % | RESPIRATION RATE: 16 BRPM | HEIGHT: 61 IN | DIASTOLIC BLOOD PRESSURE: 72 MMHG

## 2023-11-17 DIAGNOSIS — R45.86 MOOD SWINGS: ICD-10-CM

## 2023-11-17 DIAGNOSIS — F41.9 ANXIETY: ICD-10-CM

## 2023-11-17 DIAGNOSIS — E55.9 VITAMIN D DEFICIENCY: ICD-10-CM

## 2023-11-17 DIAGNOSIS — E03.9 ACQUIRED HYPOTHYROIDISM: ICD-10-CM

## 2023-11-17 DIAGNOSIS — I10 BENIGN HYPERTENSION: Primary | ICD-10-CM

## 2023-11-17 DIAGNOSIS — R73.03 PREDIABETES: ICD-10-CM

## 2023-11-17 DIAGNOSIS — N93.8 DYSFUNCTIONAL UTERINE BLEEDING: ICD-10-CM

## 2023-11-17 DIAGNOSIS — E78.5 DYSLIPIDEMIA: ICD-10-CM

## 2023-11-17 PROCEDURE — 99214 OFFICE O/P EST MOD 30 MIN: CPT | Performed by: PHYSICIAN ASSISTANT

## 2023-11-17 RX ORDER — NEOMYCIN SULFATE, POLYMYXIN B SULFATE AND DEXAMETHASONE 3.5; 10000; 1 MG/ML; [USP'U]/ML; MG/ML
SUSPENSION/ DROPS OPHTHALMIC
COMMUNITY
Start: 2023-10-03 | End: 2023-11-18

## 2023-11-17 NOTE — PROGRESS NOTES
Subjective   Vashti Hair is a 48 y.o. female who presents today in follow-up of hypertension, dyslipidemia, prediabetes, hypothyroidism, vitamin D deficiency, and moods.    Hypertension    Hypothyroidism       She was doing Weight Watchers for a month and had not lost weight.  She was only drinking water. She lost 6 lbs.   Kids were gone and not eating out as much and they were home for a while and now back in college.   Then kids back home.   Has not started Wegovy. Was nervous.   Hypertension- BP has been better on twice daily dosing. No further lower BP that she is aware. Has not checked BP- she is taking Lisinopril 5 mg twice daily. No AE. No symptoms but has not checked at home.   BP was lower in the morning and a little higher in the afternoon- averaging 105/70 in the morning and 130/80 in the afternoon.   Previous blood pressure at her [facility] was 130/90 in the afternoon and was low in office 12/2019 in the morning.    She had no dizziness with standing and no signs of hypotension.    We changed her Lisinopril 10 mg once daily to 1/2 tablet twice daily.   She previously complained of her heart pounding hard (not fast) intermittent at night several times per week while in school but this resolved when school ended for the summer.   Dyslipidemia- elevated LDL and low HDL 6/2022  Prediabetes- A1c 5.7% 6/2022, 10/2022, and 11/2023  Thyroid- Taking Synthroid 88 mcg daily as directed.   She previously noticed her hair texture was different for a few months- started over the summer. She noticed being very hot when she is trying to get ready and has to have a fan in her bathroom. She continued with increased irritability. No other heat intolerance, diarrhea.   Vitamin D- taking vitamin D 5000 IU daily.    Taking vitamin C.     Moods- moods are doing ok.   She has been feeling better on Celexa 10 mg- she may have some more feeling up tight but is able to deal with it. It is more like her, so she lux ok. She is  doing better with this dosage.   At previous visit, patient reported some mood changes with anxiety, crying, and feeling unlike herself.  She does not typically have mood changes and is pretty calm, so this was abnormal for her.    She was given Celexa 10 mg at bedtime, picked up the medication, but did not start it.    She worked on increasing exercise, mindfulness, and has settling into her new job and her daughter has settled into college.    She then started Celexa 10 mg and has had improvement in moods, reporting she was glad she started it.   She had 1 episode of dizziness on day 4 of medication, stopped the medication x 1 day, then resumed and has not had any recurrence or AE.    7/2021- She had increased irritability, mood swings, and feeling like moods are less controlled. She is short tempered. She has also had abnormal bleeding- with change in pattern and heaviness of menses.    9/2021- Increased Celexa to 20 mg- she had no worsening or AE but no improvement in irritability, agitation.  She increased Celexa to 40 mg once daily- could not get out of bed, so she went back to 20 mg and did well. She felt better. No longer severe mood swings.   When moving 8/2022- she ran out for 1 week and was crying all the time and felt horrible. Felt better with medication but noticed decreased motivation that was really bothersome.  She was not motivated to exercise at all. She did not have these issues in the past.  Sister has taken something for moods.     Headaches- Doing ok.   She started having headaches- once monthly- distinct headache right before starting menses. Head hurt all over, nausea, photophobia.  It reminded her of when was pregnant and had headache but that wave of nausea.  She was waking up with it.     DUB- regular menses. Back to normal but only 3 days and done. She had heavier menses- was 3 days and lighter and now longer and heavier.  She was having very heavy menses. She bled through 3 days in a  row. Usually not that heavy.   [employer] of the [employer]. 10/2021- they cancelled.     The following portions of the patient's history were reviewed and updated as appropriate: allergies, current medications, past family history, past medical history, past social history, past surgical history and problem list.    Review of Systems   Constitutional: Negative.    HENT: Negative.     Respiratory: Negative.     Cardiovascular: Negative.    Gastrointestinal: Negative.    Endocrine: Positive for heat intolerance.   Genitourinary:  Positive for menstrual problem.   Skin:         Hair texture change   Neurological: Negative.    Psychiatric/Behavioral:  Agitation: improved. Dysphoric mood: improved. Nervous/anxious: improved.        Objective    Vitals:    11/17/23 0800   BP: 118/72   Pulse: 87   Resp: 16   Temp: 98.9 °F (37.2 °C)   SpO2: 99%        Class 2 Severe Obesity (BMI >=35 and <=39.9). Obesity-related health conditions include the following: hypertension and dyslipidemias. Obesity is unchanged. BMI is is above average; BMI management plan is completed. We discussed portion control and increasing exercise.      Physical Exam   Constitutional: She is oriented to person, place, and time. She appears well-developed. No distress.   HENT:   Head: Normocephalic and atraumatic.   Right Ear: External ear normal.   Left Ear: External ear normal.   Nose: Nose normal.   Eyes: Conjunctivae and lids are normal. Right eye exhibits no discharge. Left eye exhibits no discharge.   Neck: Carotid bruit is not present.   Cardiovascular: Normal rate, regular rhythm, normal heart sounds and normal pulses. Exam reveals no gallop and no friction rub.   No murmur heard.  Pulmonary/Chest: Effort normal and breath sounds normal. No respiratory distress. She has no wheezes. She has no rhonchi. She has no rales.   Abdominal: Normal appearance.   Musculoskeletal: No deformity.   Neurological: She is alert and oriented to person, place, and time.  Gait normal.   Skin: Skin is warm and dry.   Psychiatric: Her speech is normal and behavior is normal. Mood, judgment and thought content normal. She is attentive.   Nursing note and vitals reviewed.      Assessment & Plan   Diagnoses and all orders for this visit:    1. Benign hypertension (Primary)  -     Comprehensive Metabolic Panel; Future  -     Urinalysis With Culture If Indicated -; Future    2. Dyslipidemia  -     Comprehensive Metabolic Panel; Future  -     Lipid Panel With LDL / HDL Ratio; Future    3. Prediabetes  -     CBC & Differential; Future  -     Comprehensive Metabolic Panel; Future  -     Hemoglobin A1c; Future  -     Urinalysis With Culture If Indicated -; Future    4. Acquired hypothyroidism  -     CBC & Differential; Future  -     TSH; Future  -     T4, free; Future  -     T3, Free; Future    5. Vitamin D deficiency  -     Comprehensive Metabolic Panel; Future  -     Vitamin D,25-Hydroxy; Future    6. Anxiety    7. Mood swings    8. Dysfunctional uterine bleeding               Assessment and Plan  I discussed lab results with patient today. Follow up in 6 months. Can [facility] fasting [facility] at [facility] or prior.       Hypertension- Blood pressure is stable today. Continue lisinopril 5 mg twice daily and monitor blood pressures.   Dyslipidemia- Lipids improved about 30 points. Continue diet and exercise. Await labs for further recommendations  Prediabetes- A1C is stable at 5.7%.  We could consider Wegovy pending labs if she would like to try this in addition to lifestyle change.  Patient will let me know if she would like to try this medication.  Hypothyroidism- Thyroid is stable. Continue Synthroid 88 mcg daily.    Vitamin D deficiency- Vitamin D is stable. Continue vitamin D 5000 IU daily.   Anxiety- Moods were stable then had been more uncontrolled with irritability, mood swings, feeling hot when trying to get ready, and change in menses. Patient could be perimenopausal. We increased Celexa from  10 mg once daily to 20 mg once daily without improvement in moods then to 40 mg once daily. She did not tolerate increased dose and resumed 20 mg once daily. With decreased motivation, we decreased again to 10 mg.  She has had significant improvement in moods with decreasing again. Continue Celexa 10 mg daily. She will call if she has worsening moods or AE with medication.   Consideration of changing to Effexor or Pristiq if uncontrolled moods, symptoms. She will find out what her sister did well with when she went through perimenopause/ menopause to consider that medication. She can also talk with GYN about symptoms and menses. To be seen if worsening moods or AE with medication.    Headache-She should be seen if she has persistent, recurrence, worsening, new or changing symptoms  Menorrhagia, change in menses- Patient to contact her GYN for follow up and to discuss any menstrual changes and symptoms.    I spent 30 minutes caring for Vashti Hair on this date of service. This time includes time spent by me in the following activities as necessary: preparing for the visit, reviewing tests, specialists records and previous visits, obtaining and/or reviewing a separately obtained history, performing a medically appropriate exam and/or evaluation, counseling and educating the patient, family, caregiver, referring and/or communicating with other healthcare professionals, documenting information in the medical record, independently interpreting results and communicating that information with the patient, family, caregiver, and developing a medically appropriate treatment plan with consideration of other conditions, medications, and treatments.              Answers submitted by the patient for this visit:  Other (Submitted on 8/14/2023)  Please describe your symptoms.: 3-4 month check in for high blood pressure and thyroid and borderline diabetes (blood work)  Have you had these symptoms before?: Yes  How long have you  been having these symptoms?: Greater than 2 weeks  Please list any medications you are currently taking for this condition.: Levythroxine, Lisinopril  Please describe any probable cause for these symptoms. : Ongoing  Primary Reason for Visit (Submitted on 8/14/2023)  What is the primary reason for your visit?: Other

## 2023-11-18 PROBLEM — E78.5 DYSLIPIDEMIA: Status: ACTIVE | Noted: 2023-11-18

## 2023-11-18 PROBLEM — R73.03 PREDIABETES: Status: ACTIVE | Noted: 2023-11-18

## 2023-11-20 ENCOUNTER — TELEPHONE (OUTPATIENT)
Dept: FAMILY MEDICINE CLINIC | Facility: CLINIC | Age: 48
End: 2023-11-20

## 2023-11-20 NOTE — TELEPHONE ENCOUNTER
Caller: Vashti Hair    Relationship to patient: Self    Best call back number: [phone]    Date of exposure:     Date of positive COVID19 test: HOME TEST POSITIVE 11-20-23     Date if possible COVID19 exposure:     COVID19 symptoms: FEVER, COUGH, SORE THROAT, CONGESTION     Date of initial quarantine:     Additional information or concerns:     What is the patients preferred pharmacy:   [facility] [address] - [phone] [facility] [phone] FX

## 2023-11-20 NOTE — TELEPHONE ENCOUNTER
Left message to call OK FOR [facility]UB TO RELAY    In order to get any medication the patient would have to be seen. We do not have openings today and are closed Wednesday, Thursday and Friday. Patient should go to [facility]

## 2024-01-02 ENCOUNTER — OFFICE VISIT (OUTPATIENT)
Dept: FAMILY MEDICINE CLINIC | Facility: CLINIC | Age: 49
End: 2024-01-02
Payer: COMMERCIAL

## 2024-01-02 VITALS
TEMPERATURE: 98.6 F | SYSTOLIC BLOOD PRESSURE: 122 MMHG | OXYGEN SATURATION: 99 % | DIASTOLIC BLOOD PRESSURE: 74 MMHG | HEIGHT: 61 IN | HEART RATE: 98 BPM | RESPIRATION RATE: 18 BRPM | BODY MASS INDEX: 35.68 KG/M2 | WEIGHT: 189 LBS

## 2024-01-02 DIAGNOSIS — J06.9 ACUTE URI: ICD-10-CM

## 2024-01-02 DIAGNOSIS — R05.1 ACUTE COUGH: Primary | ICD-10-CM

## 2024-01-02 LAB
EXPIRATION DATE: NORMAL
FLUAV AG UPPER RESP QL IA.RAPID: NOT DETECTED
FLUBV AG UPPER RESP QL IA.RAPID: NOT DETECTED
INTERNAL CONTROL: NORMAL
Lab: NORMAL
SARS-COV-2 AG UPPER RESP QL IA.RAPID: NOT DETECTED

## 2024-01-02 PROCEDURE — 99213 OFFICE O/P EST LOW 20 MIN: CPT | Performed by: PHYSICIAN ASSISTANT

## 2024-01-02 PROCEDURE — 87428 SARSCOV & INF VIR A&B AG IA: CPT | Performed by: PHYSICIAN ASSISTANT

## 2024-01-02 RX ORDER — ALBUTEROL SULFATE 90 UG/1
2 AEROSOL, METERED RESPIRATORY (INHALATION) EVERY 4 HOURS PRN
Qty: 18 G | Refills: 0 | Status: SHIPPED | OUTPATIENT
Start: 2024-01-02

## 2024-01-02 RX ORDER — DOXYCYCLINE HYCLATE 100 MG/1
100 CAPSULE ORAL 2 TIMES DAILY
Qty: 20 CAPSULE | Refills: 0 | Status: SHIPPED | OUTPATIENT
Start: 2024-01-02

## 2024-01-02 RX ORDER — GUAIFENESIN AND DEXTROMETHORPHAN HYDROBROMIDE 600; 30 MG/1; MG/1
1 TABLET, EXTENDED RELEASE ORAL 2 TIMES DAILY
Qty: 45 TABLET | Refills: 0 | Status: SHIPPED | OUTPATIENT
Start: 2024-01-02

## 2024-01-02 RX ORDER — TRIAMCINOLONE ACETONIDE 55 UG/1
2 SPRAY, METERED NASAL DAILY
Qty: 16.9 ML | Refills: 0 | Status: SHIPPED | OUTPATIENT
Start: 2024-01-02 | End: 2025-01-01

## 2024-01-02 RX ORDER — METHYLPREDNISOLONE 4 MG/1
TABLET ORAL
Qty: 21 TABLET | Refills: 0 | Status: SHIPPED | OUTPATIENT
Start: 2024-01-02

## 2024-01-02 NOTE — PROGRESS NOTES
Subjective   Vashti Hair is a 48 y.o. female who presents today for evaluation of cough and wheezing.     History of Present Illness     Had Covid the week of Thanksgiving- then  both got it for Thanksgiving. Feels like she was completely better. When she coughed, it was not productive- hurt but no production. Had severe sore throat with Covid 19.     Started 12/27/2023 with body aches, cough with production. At a basketball tournament in [place] and was in bed for 2 1/2 days. Sleeping a lot. She has had resolution of aching and not as tired but more tired than usual. Some head congestion. Had sore throat 12/29/2023 x 1 1/2 days then resolved. Did not check temperature. Has had continued cough with chest heaviness. Wheezing. She has noted worsening with laying down.   No V, D.     She was taken Ibuprofen. No other medication.     The following portions of the patient's history were reviewed and updated as appropriate: allergies, current medications, past family history, past medical history, past social history, past surgical history and problem list.    Review of Systems    Objective   Vitals:    01/02/24 1046   BP: 122/74   Pulse: 98   Resp: 18   Temp: 98.6 °F (37 °C)   SpO2: 99%     Body mass index is 35.73 kg/m².    Physical Exam  Vitals and nursing note reviewed.   Constitutional:       Appearance: She is well-developed.   HENT:      Head: Normocephalic and atraumatic.      Right Ear: Tympanic membrane, ear canal and external ear normal.      Left Ear: Tympanic membrane, ear canal and external ear normal.      Nose: Nose normal.      Mouth/Throat:      Pharynx: Uvula midline.   Eyes:      Conjunctiva/sclera: Conjunctivae normal.   Cardiovascular:      Rate and Rhythm: Normal rate and regular rhythm.      Heart sounds: Normal heart sounds. No murmur heard.     No friction rub. No gallop.   Pulmonary:      Effort: Pulmonary effort is normal.      Breath sounds: Normal breath sounds. No wheezing, rhonchi  or rales.   Musculoskeletal:      Cervical back: Neck supple.   Skin:     General: Skin is warm and dry.   Neurological:      Mental Status: She is alert and oriented to person, place, and time.   Psychiatric:         Speech: Speech normal.         Behavior: Behavior normal.         Thought Content: Thought content normal.         Assessment & Plan   Diagnoses and all orders for this visit:    1. Acute cough (Primary)  -     POCT SARS-CoV-2 Antigen VALERIA + Flu    2. Acute URI  -     guaifenesin-dextromethorphan (MUCINEX DM)  MG tablet sustained-release 12 hour tablet; Take 1 tablet by mouth 2 (Two) Times a Day.  Dispense: 45 tablet; Refill: 0  -     albuterol sulfate  (90 Base) MCG/ACT inhaler; Inhale 2 puffs Every 4 (Four) Hours As Needed for Wheezing.  Dispense: 18 g; Refill: 0  -     methylPREDNISolone (Medrol) 4 MG dose pack; follow package directions  Dispense: 21 tablet; Refill: 0  -     doxycycline (VIBRAMYCIN) 100 MG capsule; Take 1 capsule by mouth 2 (Two) Times a Day.  Dispense: 20 capsule; Refill: 0  -     Triamcinolone Acetonide (Nasacort Allergy 24HR) 55 MCG/ACT nasal inhaler; 2 sprays into the nostril(s) as directed by provider Daily.  Dispense: 16.9 mL; Refill: 0        Assessment and Plan  Negative flu and COVID today.  Patient to obtain pulse oximeter and monitor oxygen and pulse closely at rest, with ambulation, and if possible, have someone monitor occasionally while sleeping. To ER ASAP if any oxygen saturation less than 90%, persistent tachycardia, or if worsening respiratory symptoms or systemic symptoms- uncontrolled fever, weakness, dizziness, confusion or mental status changes. Treat symptoms- to use Mucinex DM twice daily, Nasacort or Flonase 2 sprays in each nostril once daily and Claritin or Zyrtec 10 mg once daily if nasal congestion. Avoid decongestants to avoid worsening tachycardia. Tylenol as needed for fever and ensure proper hydration.  I will give albuterol as needed  for reactive cough or chest tightness.  If no resolution of symptoms, worsening, new or changing symptoms, she should take Medrol Dosepak as directed and doxycycline 100 mg twice daily for 10 days.  To be seen if no improvement, worsening, new, or changing symptoms or if no resolution of symptoms. Quarantine as directed today.     I spent 20 minutes caring for Vashti Hair on this date of service. This time includes time spent by me in the following activities as necessary: preparing for the visit, reviewing tests, specialists records and previous visits, obtaining and/or reviewing a separately obtained history, performing a medically appropriate exam and/or evaluation, counseling and educating the patient, family, caregiver, referring and/or communicating with other healthcare professionals, documenting information in the medical record, independently interpreting results and communicating that information with the patient, family, caregiver, and developing a medically appropriate treatment plan with consideration of other conditions, medications, and treatments.

## 2024-01-02 NOTE — LETTER
January 2, 2024     Patient: Vashti Hair   YOB: 1975   Date of Visit: 1/2/2024       To Whom It May Concern:    It is my medical opinion that Vashti Hair  should work from home due to communicable illness 1/2/2024 through 1/5/2024. She can resume in person work 1/8/2024 .           Sincerely,        HITESH Rae    CC: No Recipients

## 2024-02-15 DIAGNOSIS — F41.9 ANXIETY: ICD-10-CM

## 2024-02-16 NOTE — TELEPHONE ENCOUNTER
Please see if she has changed dosage. She was taking Celexa 10 mg daily and I changed prescription to 10 mg to avoid cutting it. Please send refill of 10 mg if this is what she is needing or let me know if she increased to 20 mg.

## 2024-02-25 DIAGNOSIS — I10 BENIGN HYPERTENSION: ICD-10-CM

## 2024-02-27 RX ORDER — LISINOPRIL 5 MG/1
5 TABLET ORAL 2 TIMES DAILY
Qty: 180 TABLET | Refills: 1 | Status: SHIPPED | OUTPATIENT
Start: 2024-02-27

## 2024-02-27 RX ORDER — CITALOPRAM HYDROBROMIDE 10 MG/1
10 TABLET ORAL DAILY
Qty: 90 TABLET | Refills: 1 | Status: SHIPPED | OUTPATIENT
Start: 2024-02-27

## 2024-03-11 DIAGNOSIS — E03.9 ACQUIRED HYPOTHYROIDISM: ICD-10-CM

## 2024-03-11 RX ORDER — LEVOTHYROXINE SODIUM 88 UG/1
88 TABLET ORAL DAILY
Qty: 90 TABLET | Refills: 1 | Status: SHIPPED | OUTPATIENT
Start: 2024-03-11

## 2024-05-29 ENCOUNTER — TELEPHONE (OUTPATIENT)
Dept: FAMILY MEDICINE CLINIC | Facility: CLINIC | Age: 49
End: 2024-05-29

## 2024-05-29 ENCOUNTER — OFFICE VISIT (OUTPATIENT)
Dept: FAMILY MEDICINE CLINIC | Facility: CLINIC | Age: 49
End: 2024-05-29
Payer: COMMERCIAL

## 2024-05-29 VITALS
OXYGEN SATURATION: 99 % | BODY MASS INDEX: 34.74 KG/M2 | DIASTOLIC BLOOD PRESSURE: 70 MMHG | HEART RATE: 72 BPM | HEIGHT: 61 IN | RESPIRATION RATE: 14 BRPM | TEMPERATURE: 98.5 F | SYSTOLIC BLOOD PRESSURE: 110 MMHG | WEIGHT: 184 LBS

## 2024-05-29 DIAGNOSIS — F41.9 ANXIETY: ICD-10-CM

## 2024-05-29 DIAGNOSIS — I10 BENIGN HYPERTENSION: Primary | ICD-10-CM

## 2024-05-29 DIAGNOSIS — E55.9 VITAMIN D DEFICIENCY: ICD-10-CM

## 2024-05-29 DIAGNOSIS — R73.03 PREDIABETES: ICD-10-CM

## 2024-05-29 DIAGNOSIS — E78.5 DYSLIPIDEMIA: ICD-10-CM

## 2024-05-29 DIAGNOSIS — N93.8 DYSFUNCTIONAL UTERINE BLEEDING: ICD-10-CM

## 2024-05-29 DIAGNOSIS — R45.86 MOOD SWINGS: ICD-10-CM

## 2024-05-29 DIAGNOSIS — E03.9 ACQUIRED HYPOTHYROIDISM: ICD-10-CM

## 2024-05-29 DIAGNOSIS — Z12.11 COLON CANCER SCREENING: ICD-10-CM

## 2024-05-29 DIAGNOSIS — R51.9 NONINTRACTABLE EPISODIC HEADACHE, UNSPECIFIED HEADACHE TYPE: ICD-10-CM

## 2024-05-29 PROCEDURE — 99214 OFFICE O/P EST MOD 30 MIN: CPT | Performed by: PHYSICIAN ASSISTANT

## 2024-05-29 NOTE — PROGRESS NOTES
Subjective   Vashti Hair is a 49 y.o. female who presents today in follow-up of hypertension, dyslipidemia, prediabetes, hypothyroidism, vitamin D deficiency, and moods.    Hypertension    Hypothyroidism    Hyperlipidemia  Exacerbating diseases include hypothyroidism.    Answers submitted by the patient for this visit:  Other (Submitted on 5/29/2024)  Please describe your symptoms.: No symptoms, just a routine check for high blood pressure, elevated sugar, and thyroid check.  Have you had these symptoms before?: Yes  How long have you been having these symptoms?: Greater than 2 weeks  Please list any medications you are currently taking for this condition.: Levothyroxine, Citalprolam, Lisinopril  Please describe any probable cause for these symptoms. : N/A  Primary Reason for Visit (Submitted on 5/29/2024)  What is the primary reason for your visit?: Other      No FHx colon cancer. Never had screening.     Doing weight watchers and walking. Buying the meals already made- she is concerned about sodium. Feeling motivated and has been a long time since she has had motivation. Feeling better and lost several lbs in a few weeks.   She was doing Weight Watchers for a month and had not lost weight.  She was only drinking water. She lost 6 lbs.   Kids were gone and not eating out as much and they were home for a while and now back in college.   Then kids back home.   Has not started Wegovy. Was nervous.   Hypertension- BP has been better on twice daily dosing. No further lower BP that she is aware. Has not checked BP- she is taking Lisinopril 5 mg twice daily. No AE. No symptoms but has not checked at home.   BP was lower in the morning and a little higher in the afternoon- averaging 105/70 in the morning and 130/80 in the afternoon.   Previous blood pressure at her OBGYN was 130/90 in the afternoon and was low in office 12/2019 in the morning.    She had no dizziness with standing and no signs of hypotension.    We  changed her Lisinopril 10 mg once daily to 1/2 tablet twice daily.   She previously complained of her heart pounding hard (not fast) intermittent at night several times per week while in school but this resolved when school ended for the summer.   Dyslipidemia- elevated LDL and low HDL 6/2022  Prediabetes- A1c 5.7% 6/2022, 10/2022, and 11/2023  Thyroid- Taking Synthroid 88 mcg daily as directed.   She previously noticed her hair texture was different for a few months- started over the summer. She noticed being very hot when she is trying to get ready and has to have a fan in her bathroom. She continued with increased irritability. No other heat intolerance, diarrhea.   Vitamin D- taking vitamin D 5000 IU daily.  Ran out a couple days ago.   Taking vitamin C.     Moods- moods are doing ok.   She was feeling better on Celexa 10 mg- she may have some more feeling up tight but is able to deal with it. It is more like her, so she lux ok. She is doing better with this dosage.   At previous visit, patient reported some mood changes with anxiety, crying, and feeling unlike herself.  She does not typically have mood changes and is pretty calm, so this was abnormal for her.    She was given Celexa 10 mg at bedtime, picked up the medication, but did not start it.    She worked on increasing exercise, mindfulness, and has settling into her new job and her daughter has settled into college.    She then started Celexa 10 mg and has had improvement in moods, reporting she was glad she started it.   She had 1 episode of dizziness on day 4 of medication, stopped the medication x 1 day, then resumed and has not had any recurrence or AE.    7/2021- She had increased irritability, mood swings, and feeling like moods are less controlled. She is short tempered. She has also had abnormal bleeding- with change in pattern and heaviness of menses.    9/2021- Increased Celexa to 20 mg- she had no worsening or AE but no improvement in  irritability, agitation.  She increased Celexa to 40 mg once daily- could not get out of bed, so she went back to 20 mg and did well. She felt better. No longer severe mood swings.   When moving 8/2022- she ran out for 1 week and was crying all the time and felt horrible. Felt better with medication but noticed decreased motivation that was really bothersome.  She was not motivated to exercise at all. She did not have these issues in the past.  Sister has taken something for moods.     Headaches- has hormonal headaches- not this month. The only time she gets them.   She started having headaches- once monthly- distinct headache right before starting menses. Head hurt all over, nausea, photophobia.  It reminded her of when was pregnant and had headache but that wave of nausea.  She was waking up with it.     DUB-   She had regular menses. Back to normal but only 3 days and done. She had heavier menses- was 3 days and lighter and now longer and heavier.  She was having very heavy menses. She bled through 3 days in a row. Usually not that heavy.   [facility]. 10/2021- they cancelled.     The following portions of the patient's history were reviewed and updated as appropriate: allergies, current medications, past family history, past medical history, past social history, past surgical history and problem list.    Review of Systems   Constitutional: Negative.    HENT: Negative.     Respiratory: Negative.     Cardiovascular: Negative.    Gastrointestinal: Negative.    Endocrine: Positive for heat intolerance.   Genitourinary:  Positive for menstrual problem.   Skin:         Hair texture change   Neurological: Negative.    Psychiatric/Behavioral:  Agitation: improved. Dysphoric mood: improved. Nervous/anxious: improved.      Objective    Vitals:    05/29/24 1141   BP: 110/70   Pulse: 72   Resp: 14   Temp: 98.5 °F (36.9 °C)   SpO2: 99%   Body mass index is 34.78 kg/m².     Physical Exam   Constitutional: She is  oriented to person, place, and time. She appears well-developed. No distress.   HENT:   Head: Normocephalic and atraumatic.   Right Ear: External ear normal.   Left Ear: External ear normal.   Nose: Nose normal.   Eyes: Conjunctivae and lids are normal. Right eye exhibits no discharge. Left eye exhibits no discharge.   Neck: Carotid bruit is not present.   Cardiovascular: Normal rate, regular rhythm, normal heart sounds and normal pulses. Exam reveals no gallop and no friction rub.   No murmur heard.  Pulmonary/Chest: Effort normal and breath sounds normal. No respiratory distress. She has no wheezes. She has no rhonchi. She has no rales.   Abdominal: Normal appearance.   Musculoskeletal: No deformity.   Neurological: She is alert and oriented to person, place, and time. Gait normal.   Skin: Skin is warm and dry.   Psychiatric: Her speech is normal and behavior is normal. Mood, judgment and thought content normal. She is attentive.   Nursing note and vitals reviewed.      Assessment & Plan   Diagnoses and all orders for this visit:    1. Benign hypertension (Primary)  -     Comprehensive Metabolic Panel; Future  -     Urinalysis With Culture If Indicated -; Future    2. Dyslipidemia  -     Comprehensive Metabolic Panel; Future  -     CK; Future  -     Lipid Panel With LDL / HDL Ratio; Future    3. Prediabetes  -     CBC & Differential; Future  -     Comprehensive Metabolic Panel; Future  -     Hemoglobin A1c; Future    4. Acquired hypothyroidism  -     CBC & Differential; Future  -     TSH; Future  -     T4, free; Future  -     T3, Free; Future    5. Vitamin D deficiency  -     Comprehensive Metabolic Panel; Future  -     Vitamin D,25-Hydroxy; Future    6. Anxiety    7. Mood swings    8. Nonintractable episodic headache, unspecified headache type    9. Dysfunctional uterine bleeding    10. Colon cancer screening  -     Cologuard - Stool, Per Rectum; Future               Assessment and Plan  Patient will have fasting  labs. Call if no results in 1 week. Stability of conditions, plan, follow up, and further recommendations pending labs. Follow up in 6 months if labs are stable. Can complete fasting lab at [facility] or prior.       Hypertension- Blood pressure is stable today. Continue lisinopril 5 mg twice daily and monitor blood pressures.   Dyslipidemia- Lipids improved about 30 points. Continue diet and exercise. Await labs for further recommendations  Prediabetes- A1C was stable at 5.7%.  We could consider Wegovy pending labs if she would like to try this in addition to lifestyle change.  Patient will let me know if she would like to try this medication.  Hypothyroidism- Thyroid was stable. Continue Synthroid 88 mcg daily.    Vitamin D deficiency- Vitamin D was stable. Continue vitamin D 5000 IU daily.  Dosing recommendations pending lab results.  Anxiety- Moods were stable then wore more uncontrolled with irritability, mood swings, feeling hot when trying to get ready, and change in menses. Patient could be perimenopausal. We increased Celexa from 10 mg once daily to 20 mg once daily without improvement in moods then to 40 mg once daily. She did not tolerate increased dose and resumed 20 mg once daily. With decreased motivation, we decreased again to 10 mg.  She has had significant improvement in moods with decreasing again. Continue Celexa 10 mg daily. She will call if she has worsening moods or AE with medication.   Consideration of changing to Effexor or Pristiq if uncontrolled moods, symptoms. She will find out what her sister did well with when she went through perimenopause/ menopause to consider that medication. She can also talk with GYN about symptoms and menses. To be seen if worsening moods or AE with medication.    Headache-She should be seen if she has persistent, recurrence, worsening, new or changing symptoms  Menorrhagia, change in menses- Patient to contact her GYN for follow up and to discuss any menstrual  changes and symptoms.    I spent 30 minutes caring for Vashti Hair on this date of service. This time includes time spent by me in the following activities as necessary: preparing for the visit, reviewing tests, specialists records and previous visits, obtaining and/or reviewing a separately obtained history, performing a medically appropriate exam and/or evaluation, counseling and educating the patient, family, caregiver, referring and/or communicating with other healthcare professionals, documenting information in the medical record, independently interpreting results and communicating that information with the patient, family, caregiver, and developing a medically appropriate treatment plan with consideration of other conditions, medications, and treatments.

## 2024-05-29 NOTE — TELEPHONE ENCOUNTER
PATIENT CALLED AND SHARED INFO WITH HER. SHE WILL BE ON HER WAY TO PICKUP THE MEDICATION UBRELVY.    CALL BACK NUMBER [phone]

## 2024-05-29 NOTE — TELEPHONE ENCOUNTER
left message to call OK FOR HUB TO RELAY  From johanna grey    I meant to give this patient 2 samples of Ubrelvy. I forgot to give them to her. Please see if we can call her and see if she will come  2 samples of Ubrelvy.

## 2024-09-06 ENCOUNTER — OFFICE VISIT (OUTPATIENT)
Dept: FAMILY MEDICINE CLINIC | Facility: CLINIC | Age: 49
End: 2024-09-06
Payer: COMMERCIAL

## 2024-09-06 VITALS
HEIGHT: 61 IN | SYSTOLIC BLOOD PRESSURE: 116 MMHG | TEMPERATURE: 98.4 F | DIASTOLIC BLOOD PRESSURE: 68 MMHG | OXYGEN SATURATION: 98 % | HEART RATE: 80 BPM | RESPIRATION RATE: 16 BRPM | BODY MASS INDEX: 33.23 KG/M2 | WEIGHT: 176 LBS

## 2024-09-06 DIAGNOSIS — E55.9 VITAMIN D DEFICIENCY: ICD-10-CM

## 2024-09-06 DIAGNOSIS — E78.5 DYSLIPIDEMIA: ICD-10-CM

## 2024-09-06 DIAGNOSIS — E66.9 OBESITY (BMI 30.0-34.9): Primary | ICD-10-CM

## 2024-09-06 DIAGNOSIS — R51.9 NONINTRACTABLE EPISODIC HEADACHE, UNSPECIFIED HEADACHE TYPE: ICD-10-CM

## 2024-09-06 DIAGNOSIS — E03.9 ACQUIRED HYPOTHYROIDISM: ICD-10-CM

## 2024-09-06 DIAGNOSIS — R45.86 MOOD SWINGS: ICD-10-CM

## 2024-09-06 DIAGNOSIS — R73.03 PREDIABETES: ICD-10-CM

## 2024-09-06 DIAGNOSIS — F41.9 ANXIETY: ICD-10-CM

## 2024-09-06 DIAGNOSIS — I10 BENIGN HYPERTENSION: ICD-10-CM

## 2024-09-06 DIAGNOSIS — Z12.31 ENCOUNTER FOR SCREENING MAMMOGRAM FOR MALIGNANT NEOPLASM OF BREAST: ICD-10-CM

## 2024-09-06 RX ORDER — SEMAGLUTIDE 0.25 MG/.5ML
0.25 INJECTION, SOLUTION SUBCUTANEOUS WEEKLY
Qty: 2 ML | Refills: 0 | Status: SHIPPED | OUTPATIENT
Start: 2024-09-06

## 2024-09-07 LAB
25(OH)D3+25(OH)D2 SERPL-MCNC: 51.2 NG/ML (ref 30–100)
ALBUMIN SERPL-MCNC: 4.2 G/DL (ref 3.5–5.2)
ALBUMIN/GLOB SERPL: 1.6 G/DL
ALP SERPL-CCNC: 65 U/L (ref 39–117)
ALT SERPL-CCNC: 17 U/L (ref 1–33)
APPEARANCE UR: CLEAR
AST SERPL-CCNC: 18 U/L (ref 1–32)
BACTERIA #/AREA URNS HPF: NORMAL /[HPF]
BASOPHILS # BLD AUTO: 0.05 10*3/MM3 (ref 0–0.2)
BASOPHILS NFR BLD AUTO: 0.7 % (ref 0–1.5)
BILIRUB SERPL-MCNC: 0.3 MG/DL (ref 0–1.2)
BILIRUB UR QL STRIP: NEGATIVE
BUN SERPL-MCNC: 14 MG/DL (ref 6–20)
BUN/CREAT SERPL: 16.1 (ref 7–25)
CALCIUM SERPL-MCNC: 9.6 MG/DL (ref 8.6–10.5)
CASTS URNS QL MICRO: NORMAL /LPF
CHLORIDE SERPL-SCNC: 103 MMOL/L (ref 98–107)
CHOLEST SERPL-MCNC: 186 MG/DL (ref 0–200)
CK SERPL-CCNC: 85 U/L (ref 20–180)
CO2 SERPL-SCNC: 25.2 MMOL/L (ref 22–29)
COLOR UR: YELLOW
CREAT SERPL-MCNC: 0.87 MG/DL (ref 0.57–1)
EGFRCR SERPLBLD CKD-EPI 2021: 81.3 ML/MIN/1.73
EOSINOPHIL # BLD AUTO: 0.17 10*3/MM3 (ref 0–0.4)
EOSINOPHIL NFR BLD AUTO: 2.5 % (ref 0.3–6.2)
EPI CELLS #/AREA URNS HPF: NORMAL /HPF (ref 0–10)
ERYTHROCYTE [DISTWIDTH] IN BLOOD BY AUTOMATED COUNT: 13.3 % (ref 12.3–15.4)
GLOBULIN SER CALC-MCNC: 2.7 GM/DL
GLUCOSE SERPL-MCNC: 90 MG/DL (ref 65–99)
GLUCOSE UR QL STRIP: NEGATIVE
HBA1C MFR BLD: 5.9 % (ref 4.8–5.6)
HCT VFR BLD AUTO: 42.7 % (ref 34–46.6)
HDLC SERPL-MCNC: 52 MG/DL (ref 40–60)
HGB BLD-MCNC: 13.6 G/DL (ref 12–15.9)
HGB UR QL STRIP: NEGATIVE
IMM GRANULOCYTES # BLD AUTO: 0.02 10*3/MM3 (ref 0–0.05)
IMM GRANULOCYTES NFR BLD AUTO: 0.3 % (ref 0–0.5)
KETONES UR QL STRIP: NEGATIVE
LDLC SERPL CALC-MCNC: 113 MG/DL (ref 0–100)
LDLC/HDLC SERPL: 2.12 {RATIO}
LEUKOCYTE ESTERASE UR QL STRIP: NEGATIVE
LYMPHOCYTES # BLD AUTO: 1.71 10*3/MM3 (ref 0.7–3.1)
LYMPHOCYTES NFR BLD AUTO: 25.4 % (ref 19.6–45.3)
MCH RBC QN AUTO: 28.4 PG (ref 26.6–33)
MCHC RBC AUTO-ENTMCNC: 31.9 G/DL (ref 31.5–35.7)
MCV RBC AUTO: 89.1 FL (ref 79–97)
MICRO URNS: NORMAL
MICRO URNS: NORMAL
MONOCYTES # BLD AUTO: 0.46 10*3/MM3 (ref 0.1–0.9)
MONOCYTES NFR BLD AUTO: 6.8 % (ref 5–12)
NEUTROPHILS # BLD AUTO: 4.31 10*3/MM3 (ref 1.7–7)
NEUTROPHILS NFR BLD AUTO: 64.3 % (ref 42.7–76)
NITRITE UR QL STRIP: NEGATIVE
NRBC BLD AUTO-RTO: 0 /100 WBC (ref 0–0.2)
PH UR STRIP: 6.5 [PH] (ref 5–7.5)
PLATELET # BLD AUTO: 356 10*3/MM3 (ref 140–450)
POTASSIUM SERPL-SCNC: 4.5 MMOL/L (ref 3.5–5.2)
PROT SERPL-MCNC: 6.9 G/DL (ref 6–8.5)
PROT UR QL STRIP: NEGATIVE
RBC # BLD AUTO: 4.79 10*6/MM3 (ref 3.77–5.28)
RBC #/AREA URNS HPF: NORMAL /HPF (ref 0–2)
SODIUM SERPL-SCNC: 139 MMOL/L (ref 136–145)
SP GR UR STRIP: 1.02 (ref 1–1.03)
T3FREE SERPL-MCNC: 2.8 PG/ML (ref 2–4.4)
T4 FREE SERPL-MCNC: 1.4 NG/DL (ref 0.92–1.68)
TRIGL SERPL-MCNC: 120 MG/DL (ref 0–150)
TSH SERPL DL<=0.005 MIU/L-ACNC: 3.62 UIU/ML (ref 0.27–4.2)
URINALYSIS REFLEX: NORMAL
UROBILINOGEN UR STRIP-MCNC: 0.2 MG/DL (ref 0.2–1)
VLDLC SERPL CALC-MCNC: 21 MG/DL (ref 5–40)
WBC # BLD AUTO: 6.72 10*3/MM3 (ref 3.4–10.8)
WBC #/AREA URNS HPF: NORMAL /HPF (ref 0–5)

## 2024-09-08 DIAGNOSIS — I10 BENIGN HYPERTENSION: ICD-10-CM

## 2024-09-08 DIAGNOSIS — E03.9 ACQUIRED HYPOTHYROIDISM: ICD-10-CM

## 2024-09-09 RX ORDER — LISINOPRIL 5 MG/1
5 TABLET ORAL 2 TIMES DAILY
Qty: 180 TABLET | Refills: 1 | Status: SHIPPED | OUTPATIENT
Start: 2024-09-09

## 2024-09-09 RX ORDER — LEVOTHYROXINE SODIUM 88 UG/1
88 TABLET ORAL DAILY
Qty: 90 TABLET | Refills: 1 | Status: SHIPPED | OUTPATIENT
Start: 2024-09-09

## 2024-11-19 ENCOUNTER — PATIENT MESSAGE (OUTPATIENT)
Dept: FAMILY MEDICINE CLINIC | Facility: CLINIC | Age: 49
End: 2024-11-19
Payer: COMMERCIAL

## 2024-11-19 DIAGNOSIS — I10 BENIGN HYPERTENSION: ICD-10-CM

## 2024-11-19 DIAGNOSIS — R73.03 PREDIABETES: ICD-10-CM

## 2024-11-19 DIAGNOSIS — E78.5 DYSLIPIDEMIA: ICD-10-CM

## 2024-11-19 DIAGNOSIS — E66.811 OBESITY (BMI 30.0-34.9): ICD-10-CM

## 2024-11-19 RX ORDER — SEMAGLUTIDE 0.25 MG/.5ML
0.25 INJECTION, SOLUTION SUBCUTANEOUS WEEKLY
Qty: 2 ML | Refills: 0 | Status: SHIPPED | OUTPATIENT
Start: 2024-11-19

## 2024-12-08 DIAGNOSIS — E78.5 DYSLIPIDEMIA: ICD-10-CM

## 2024-12-08 DIAGNOSIS — R73.03 PREDIABETES: ICD-10-CM

## 2024-12-08 DIAGNOSIS — I10 BENIGN HYPERTENSION: ICD-10-CM

## 2024-12-08 DIAGNOSIS — E66.811 OBESITY (BMI 30.0-34.9): ICD-10-CM

## 2024-12-12 RX ORDER — SEMAGLUTIDE 0.25 MG/.5ML
0.25 INJECTION, SOLUTION SUBCUTANEOUS WEEKLY
Qty: 2 ML | Refills: 0 | Status: SHIPPED | OUTPATIENT
Start: 2024-12-12

## 2024-12-15 DIAGNOSIS — F41.9 ANXIETY: ICD-10-CM

## 2024-12-19 RX ORDER — CITALOPRAM HYDROBROMIDE 10 MG/1
10 TABLET ORAL DAILY
Qty: 90 TABLET | Refills: 1 | Status: SHIPPED | OUTPATIENT
Start: 2024-12-19

## 2025-01-01 DIAGNOSIS — R73.03 PREDIABETES: ICD-10-CM

## 2025-01-01 DIAGNOSIS — E78.5 DYSLIPIDEMIA: ICD-10-CM

## 2025-01-01 DIAGNOSIS — I10 BENIGN HYPERTENSION: ICD-10-CM

## 2025-01-01 DIAGNOSIS — E66.811 OBESITY (BMI 30.0-34.9): ICD-10-CM

## 2025-01-03 RX ORDER — SEMAGLUTIDE 0.25 MG/.5ML
0.25 INJECTION, SOLUTION SUBCUTANEOUS WEEKLY
Qty: 2 ML | Refills: 0 | Status: SHIPPED | OUTPATIENT
Start: 2025-01-03

## 2025-02-04 ENCOUNTER — OFFICE VISIT (OUTPATIENT)
Dept: FAMILY MEDICINE CLINIC | Facility: CLINIC | Age: 50
End: 2025-02-04
Payer: COMMERCIAL

## 2025-02-04 VITALS
TEMPERATURE: 97.1 F | DIASTOLIC BLOOD PRESSURE: 78 MMHG | HEART RATE: 79 BPM | WEIGHT: 174 LBS | BODY MASS INDEX: 32.85 KG/M2 | SYSTOLIC BLOOD PRESSURE: 114 MMHG | RESPIRATION RATE: 16 BRPM | OXYGEN SATURATION: 98 % | HEIGHT: 61 IN

## 2025-02-04 DIAGNOSIS — E03.9 ACQUIRED HYPOTHYROIDISM: ICD-10-CM

## 2025-02-04 DIAGNOSIS — F41.9 ANXIETY: ICD-10-CM

## 2025-02-04 DIAGNOSIS — I10 BENIGN HYPERTENSION: ICD-10-CM

## 2025-02-04 DIAGNOSIS — E66.811 OBESITY (BMI 30.0-34.9): Primary | ICD-10-CM

## 2025-02-04 DIAGNOSIS — R73.03 PREDIABETES: ICD-10-CM

## 2025-02-04 DIAGNOSIS — R45.86 MOOD SWINGS: ICD-10-CM

## 2025-02-04 DIAGNOSIS — E55.9 VITAMIN D DEFICIENCY: ICD-10-CM

## 2025-02-04 DIAGNOSIS — R51.9 NONINTRACTABLE EPISODIC HEADACHE, UNSPECIFIED HEADACHE TYPE: ICD-10-CM

## 2025-02-04 DIAGNOSIS — E78.5 DYSLIPIDEMIA: ICD-10-CM

## 2025-02-04 DIAGNOSIS — N93.8 DYSFUNCTIONAL UTERINE BLEEDING: ICD-10-CM

## 2025-02-04 PROCEDURE — 99214 OFFICE O/P EST MOD 30 MIN: CPT | Performed by: PHYSICIAN ASSISTANT

## 2025-02-04 RX ORDER — SEMAGLUTIDE 0.5 MG/.5ML
0.5 INJECTION, SOLUTION SUBCUTANEOUS WEEKLY
Qty: 2 ML | Refills: 0 | Status: SHIPPED | OUTPATIENT
Start: 2025-02-04 | End: 2025-03-03

## 2025-02-04 NOTE — PROGRESS NOTES
Subjective   Vashti Hair is a 49 y.o. female who presents today in follow-up of hypertension, dyslipidemia, prediabetes, hypothyroidism, vitamin D deficiency, and moods.    Hypertension    Hypothyroidism    Hyperlipidemia  Exacerbating diseases include hypothyroidism.      Mammogram [facility]- had to cancel GYN and needs to reschedule.     No FHx colon cancer. Never had screening.   Yesterday 173.2 lbs.   Top weight- 186 lbs  Lifestyle changes- was doing prepackaged weight watcher meals that were pastas. Has now stopped eating them. Has cut back and is doing 10,000-15,000 steps daily.   Doing weight watchers and walking. Buying the meals already made- she is concerned about sodium. Feeling motivated and has been a long time since she has had motivation. Feeling better and lost several lbs in a few weeks.   She was doing Weight Watchers for a month and had not lost weight.  She was only drinking water. She lost 6 lbs.   Kids were gone and not eating out as much and they were home for a while and now back in college.   Then kids back home.   Has not started Wegovy. Was nervous.   Hypertension- BP has been better on twice daily dosing. No further lower BP that she is aware. Has not checked BP- she is taking Lisinopril 5 mg twice daily. No AE. No symptoms but has not checked at home.   BP was lower in the morning and a little higher in the afternoon- averaging 105/70 in the morning and 130/80 in the afternoon.   Previous blood pressure at her [facility] was 130/90 in the afternoon and was low in office 12/2019 in the morning.    She had no dizziness with standing and no signs of hypotension.    We changed her Lisinopril 10 mg once daily to 1/2 tablet twice daily.   She previously complained of her heart pounding hard (not fast) intermittent at night several times per week while in school but this resolved when school ended for the summer.   Dyslipidemia- elevated LDL and low HDL 6/2022  Prediabetes- A1c 5.7%  6/2022, 10/2022, and 11/2023  Thyroid- Taking Synthroid 88 mcg daily as directed.   She previously noticed her hair texture was different for a few months- started over the summer. She noticed being very hot when she is trying to get ready and has to have a fan in her bathroom. She continued with increased irritability. No other heat intolerance, diarrhea.   Vitamin D- taking vitamin D 5000 IU daily.    Taking vitamin C.     Moods- moods are doing ok.   She was feeling better on Celexa 10 mg- she may have some more feeling up tight but is able to deal with it. It is more like her, so she lux ok. She is doing better with this dosage.   At previous visit, patient reported some mood changes with anxiety, crying, and feeling unlike herself.  She does not typically have mood changes and is pretty calm, so this was abnormal for her.    She was given Celexa 10 mg at bedtime, picked up the medication, but did not start it.    She worked on increasing exercise, mindfulness, and has settling into her new job and her daughter has settled into college.    She then started Celexa 10 mg and has had improvement in moods, reporting she was glad she started it.   She had 1 episode of dizziness on day 4 of medication, stopped the medication x 1 day, then resumed and has not had any recurrence or AE.    7/2021- She had increased irritability, mood swings, and feeling like moods are less controlled. She is short tempered. She has also had abnormal bleeding- with change in pattern and heaviness of menses.    9/2021- Increased Celexa to 20 mg- she had no worsening or AE but no improvement in irritability, agitation.  She increased Celexa to 40 mg once daily- could not get out of bed, so she went back to 20 mg and did well. She felt better. No longer severe mood swings.   When moving 8/2022- she ran out for 1 week and was crying all the time and felt horrible. Felt better with medication but noticed decreased motivation that was really  bothersome.  She was not motivated to exercise at all. She did not have these issues in the past.  Sister has taken something for moods.     Headaches- has hormonal headaches- not this month. The only time she gets them.   She started having headaches- once monthly- distinct headache right before starting menses. Head hurt all over, nausea, photophobia.  It reminded her of when was pregnant and had headache but that wave of nausea.  She was waking up with it.     DUB- 3 weeks late. PMS symptoms but no bleeding. Had not been skipping for a while. November, she skipped 2 months then twice in February then normal until now.   She had regular menses. Back to normal but only 3 days and done. She had heavier menses- was 3 days and lighter and now longer and heavier.  She was having very heavy menses. She bled through 3 days in a row. Usually not that heavy.   [facility] [employer] 10/2021- they cancelled.     The following portions of the patient's history were reviewed and updated as appropriate: allergies, current medications, past family history, past medical history, past social history, past surgical history and problem list.    Review of Systems  Objective    Vitals:    02/04/25 0836   BP: 114/78   Pulse: 79   Resp: 16   Temp: 97.1 °F (36.2 °C)   SpO2: 98%     Body mass index is 32.89 kg/m².      Physical Exam   Constitutional: She is oriented to person, place, and time. She appears well-developed. No distress.   HENT:   Head: Normocephalic and atraumatic.   Right Ear: External ear normal.   Left Ear: External ear normal.   Nose: Nose normal.   Eyes: Conjunctivae and lids are normal. Right eye exhibits no discharge. Left eye exhibits no discharge.   Neck: Carotid bruit is not present.   Cardiovascular: Normal rate, regular rhythm, normal heart sounds and normal pulses. Exam reveals no gallop and no friction rub.   No murmur heard.  Pulmonary/Chest: Effort normal and breath sounds normal. No respiratory distress.  She has no wheezes. She has no rhonchi. She has no rales.   Abdominal: Normal appearance.   Musculoskeletal: No deformity.   Neurological: She is alert and oriented to person, place, and time. Gait normal.   Skin: Skin is warm and dry.   Psychiatric: Her speech is normal and behavior is normal. Mood, judgment and thought content normal. She is attentive.   Nursing note and vitals reviewed.      Assessment & Plan   Diagnoses and all orders for this visit:    1. Obesity (BMI 30.0-34.9) (Primary)  -     CBC & Differential  -     Comprehensive Metabolic Panel  -     Hemoglobin A1c  -     CK  -     Lipid Panel With LDL / HDL Ratio  -     Vitamin D,25-Hydroxy  -     TSH  -     T4, free  -     T3, Free  -     Semaglutide-Weight Management (Wegovy) 0.5 MG/0.5ML solution auto-injector; Inject 0.5 mL under the skin into the appropriate area as directed 1 (One) Time Per Week.  Dispense: 2 mL; Refill: 0    2. Benign hypertension  -     Comprehensive Metabolic Panel  -     Semaglutide-Weight Management (Wegovy) 0.5 MG/0.5ML solution auto-injector; Inject 0.5 mL under the skin into the appropriate area as directed 1 (One) Time Per Week.  Dispense: 2 mL; Refill: 0    3. Dyslipidemia  -     Comprehensive Metabolic Panel  -     CK  -     Lipid Panel With LDL / HDL Ratio  -     Semaglutide-Weight Management (Wegovy) 0.5 MG/0.5ML solution auto-injector; Inject 0.5 mL under the skin into the appropriate area as directed 1 (One) Time Per Week.  Dispense: 2 mL; Refill: 0    4. Prediabetes  -     CBC & Differential  -     Comprehensive Metabolic Panel  -     Hemoglobin A1c  -     Semaglutide-Weight Management (Wegovy) 0.5 MG/0.5ML solution auto-injector; Inject 0.5 mL under the skin into the appropriate area as directed 1 (One) Time Per Week.  Dispense: 2 mL; Refill: 0    5. Acquired hypothyroidism  -     CBC & Differential  -     TSH  -     T4, free  -     T3, Free    6. Vitamin D deficiency  -     Comprehensive Metabolic Panel  -      Vitamin D,25-Hydroxy    7. Anxiety    8. Mood swings    9. Nonintractable episodic headache, unspecified headache type    10. Dysfunctional uterine bleeding      Assessment and Plan  Patient will have fasting labs. Call if no results in 1 week. Stability of conditions, plan, follow up, and further recommendations pending labs. Follow up in 6 months if labs are stable but must follow up in 3 months if she takes Wegovy. Can complete fasting lab at [facility] or prior.       Hypertension- Blood pressure is stable today. Continue lisinopril 5 mg twice daily and monitor blood pressures.   Dyslipidemia- Lipids improved about 30 points. Continue diet and exercise. Await labs for further recommendations  Prediabetes- A1C was stable at 5.7%.  She will consider Wegovy in addition to lifestyle change.  She will need follow up in 3 months if she takes medication.  Hypothyroidism- Thyroid was stable. Continue Synthroid 88 mcg daily.    Vitamin D deficiency- Vitamin D was stable. Continue vitamin D 5000 IU daily.  Dosing recommendations pending lab results.  Anxiety- Moods were stable then wore more uncontrolled with irritability, mood swings, feeling hot when trying to get ready, and change in menses. Patient could be perimenopausal. We increased Celexa from 10 mg once daily to 20 mg once daily without improvement in moods then to 40 mg once daily. She did not tolerate increased dose and resumed 20 mg once daily. With decreased motivation, we decreased again to 10 mg.  She has had significant improvement in moods with decreasing again. Continue Celexa 10 mg daily. She will call if she has worsening moods or AE with medication.   Consideration of changing to Effexor or Pristiq if uncontrolled moods, symptoms. She will find out what her sister did well with when she went through perimenopause/ menopause to consider that medication. She can also talk with GYN about symptoms and menses. To be seen if worsening moods or AE with medication.     Headache-She should be seen if she has persistent, recurrence, worsening, new or changing symptoms  Menorrhagia, change in menses- Patient to contact her GYN for follow up and to discuss any menstrual changes and symptoms.    I spent 30 minutes caring for Vashti Hair on this date of service. This time includes time spent by me in the following activities as necessary: preparing for the visit, reviewing tests, specialists records and previous visits, obtaining and/or reviewing a separately obtained history, performing a medically appropriate exam and/or evaluation, counseling and educating the patient, family, caregiver, referring and/or communicating with other healthcare professionals, documenting information in the medical record, independently interpreting results and communicating that information with the patient, family, caregiver, and developing a medically appropriate treatment plan with consideration of other conditions, medications, and treatments.

## 2025-02-05 ENCOUNTER — TELEPHONE (OUTPATIENT)
Dept: FAMILY MEDICINE CLINIC | Facility: CLINIC | Age: 50
End: 2025-02-05
Payer: COMMERCIAL

## 2025-02-05 LAB
25(OH)D3+25(OH)D2 SERPL-MCNC: 61.1 NG/ML (ref 30–100)
ALBUMIN SERPL-MCNC: 4 G/DL (ref 3.5–5.2)
ALBUMIN/GLOB SERPL: 1.3 G/DL
ALP SERPL-CCNC: 47 U/L (ref 39–117)
ALT SERPL-CCNC: 14 U/L (ref 1–33)
AST SERPL-CCNC: 18 U/L (ref 1–32)
BASOPHILS # BLD AUTO: 0.04 10*3/MM3 (ref 0–0.2)
BASOPHILS NFR BLD AUTO: 0.8 % (ref 0–1.5)
BILIRUB SERPL-MCNC: 0.5 MG/DL (ref 0–1.2)
BUN SERPL-MCNC: 17 MG/DL (ref 6–20)
BUN/CREAT SERPL: 17.5 (ref 7–25)
CALCIUM SERPL-MCNC: 9.5 MG/DL (ref 8.6–10.5)
CHLORIDE SERPL-SCNC: 104 MMOL/L (ref 98–107)
CHOLEST SERPL-MCNC: 152 MG/DL (ref 0–200)
CK SERPL-CCNC: 77 U/L (ref 20–180)
CO2 SERPL-SCNC: 24.1 MMOL/L (ref 22–29)
CREAT SERPL-MCNC: 0.97 MG/DL (ref 0.57–1)
EGFRCR SERPLBLD CKD-EPI 2021: 71.3 ML/MIN/1.73
EOSINOPHIL # BLD AUTO: 0.12 10*3/MM3 (ref 0–0.4)
EOSINOPHIL NFR BLD AUTO: 2.5 % (ref 0.3–6.2)
ERYTHROCYTE [DISTWIDTH] IN BLOOD BY AUTOMATED COUNT: 13.7 % (ref 12.3–15.4)
GLOBULIN SER CALC-MCNC: 3 GM/DL
GLUCOSE SERPL-MCNC: 98 MG/DL (ref 65–99)
HBA1C MFR BLD: 5.4 % (ref 4.8–5.6)
HCT VFR BLD AUTO: 41.3 % (ref 34–46.6)
HDLC SERPL-MCNC: 37 MG/DL (ref 40–60)
HGB BLD-MCNC: 13.9 G/DL (ref 12–15.9)
IMM GRANULOCYTES # BLD AUTO: 0.01 10*3/MM3 (ref 0–0.05)
IMM GRANULOCYTES NFR BLD AUTO: 0.2 % (ref 0–0.5)
LDLC SERPL CALC-MCNC: 98 MG/DL (ref 0–100)
LDLC/HDLC SERPL: 2.62 {RATIO}
LYMPHOCYTES # BLD AUTO: 1.44 10*3/MM3 (ref 0.7–3.1)
LYMPHOCYTES NFR BLD AUTO: 29.6 % (ref 19.6–45.3)
MCH RBC QN AUTO: 29.3 PG (ref 26.6–33)
MCHC RBC AUTO-ENTMCNC: 33.7 G/DL (ref 31.5–35.7)
MCV RBC AUTO: 87.1 FL (ref 79–97)
MONOCYTES # BLD AUTO: 0.43 10*3/MM3 (ref 0.1–0.9)
MONOCYTES NFR BLD AUTO: 8.8 % (ref 5–12)
NEUTROPHILS # BLD AUTO: 2.83 10*3/MM3 (ref 1.7–7)
NEUTROPHILS NFR BLD AUTO: 58.1 % (ref 42.7–76)
NRBC BLD AUTO-RTO: 0 /100 WBC (ref 0–0.2)
PLATELET # BLD AUTO: 343 10*3/MM3 (ref 140–450)
POTASSIUM SERPL-SCNC: 4.5 MMOL/L (ref 3.5–5.2)
PROT SERPL-MCNC: 7 G/DL (ref 6–8.5)
RBC # BLD AUTO: 4.74 10*6/MM3 (ref 3.77–5.28)
SODIUM SERPL-SCNC: 141 MMOL/L (ref 136–145)
T3FREE SERPL-MCNC: 2.6 PG/ML (ref 2–4.4)
T4 FREE SERPL-MCNC: 1.5 NG/DL (ref 0.92–1.68)
TRIGL SERPL-MCNC: 91 MG/DL (ref 0–150)
TSH SERPL DL<=0.005 MIU/L-ACNC: 1.5 UIU/ML (ref 0.27–4.2)
VLDLC SERPL CALC-MCNC: 17 MG/DL (ref 5–40)
WBC # BLD AUTO: 4.87 10*3/MM3 (ref 3.4–10.8)

## 2025-02-05 NOTE — TELEPHONE ENCOUNTER
Name:       Relationship:       Best Callback Number: [phone]        [facility]UB PROVIDED THE RELAY MESSAGE FROM THE OFFICE      PATIENT: VOICED UNDERSTANDING AND HAS NO FURTHER QUESTIONS AT THIS TIME    ADDITIONAL INFORMATION:

## 2025-02-05 NOTE — TELEPHONE ENCOUNTER
left message to call OK FOR HUB TO RELAY    Labs look great.  Cholesterol has improved significantly, A1c is back to normal.  Vitamin D is a little higher than needed with supplement.  Decrease supplement to every other day.

## 2025-03-03 DIAGNOSIS — R73.03 PREDIABETES: ICD-10-CM

## 2025-03-03 DIAGNOSIS — I10 BENIGN HYPERTENSION: ICD-10-CM

## 2025-03-03 DIAGNOSIS — E78.5 DYSLIPIDEMIA: ICD-10-CM

## 2025-03-03 DIAGNOSIS — E66.811 OBESITY (BMI 30.0-34.9): ICD-10-CM

## 2025-03-03 RX ORDER — SEMAGLUTIDE 1 MG/.5ML
1 INJECTION, SOLUTION SUBCUTANEOUS WEEKLY
Qty: 2 ML | Refills: 0 | Status: SHIPPED | OUTPATIENT
Start: 2025-03-03

## 2025-03-03 RX ORDER — SEMAGLUTIDE 0.5 MG/.5ML
0.5 INJECTION, SOLUTION SUBCUTANEOUS WEEKLY
Qty: 2 ML | Refills: 0 | Status: CANCELLED | OUTPATIENT
Start: 2025-03-03

## 2025-03-03 NOTE — TELEPHONE ENCOUNTER
I sent the 1 mg to the pharmacy.  She should let me know if she has any concerns or let me know after her third injection if she would like to continue this dose or increase to 1.7.

## 2025-03-17 ENCOUNTER — PATIENT MESSAGE (OUTPATIENT)
Dept: FAMILY MEDICINE CLINIC | Facility: CLINIC | Age: 50
End: 2025-03-17
Payer: COMMERCIAL

## 2025-03-21 DIAGNOSIS — E66.811 OBESITY (BMI 30.0-34.9): ICD-10-CM

## 2025-03-21 DIAGNOSIS — E78.5 DYSLIPIDEMIA: ICD-10-CM

## 2025-03-21 DIAGNOSIS — I10 BENIGN HYPERTENSION: ICD-10-CM

## 2025-03-21 DIAGNOSIS — R73.03 PREDIABETES: ICD-10-CM

## 2025-03-22 RX ORDER — SEMAGLUTIDE 1 MG/.5ML
1 INJECTION, SOLUTION SUBCUTANEOUS WEEKLY
Qty: 2 ML | Refills: 2 | Status: SHIPPED | OUTPATIENT
Start: 2025-03-22

## 2025-03-28 DIAGNOSIS — E78.5 DYSLIPIDEMIA: ICD-10-CM

## 2025-03-28 DIAGNOSIS — I10 BENIGN HYPERTENSION: ICD-10-CM

## 2025-03-28 DIAGNOSIS — E66.811 OBESITY (BMI 30.0-34.9): ICD-10-CM

## 2025-03-28 DIAGNOSIS — R73.03 PREDIABETES: ICD-10-CM

## 2025-03-28 RX ORDER — SEMAGLUTIDE 1 MG/.5ML
1 INJECTION, SOLUTION SUBCUTANEOUS WEEKLY
Qty: 2 ML | Refills: 2 | Status: SHIPPED | OUTPATIENT
Start: 2025-03-28

## 2025-04-06 DIAGNOSIS — I10 BENIGN HYPERTENSION: ICD-10-CM

## 2025-04-08 RX ORDER — LISINOPRIL 5 MG/1
5 TABLET ORAL 2 TIMES DAILY
Qty: 180 TABLET | Refills: 1 | Status: SHIPPED | OUTPATIENT
Start: 2025-04-08

## 2025-04-28 DIAGNOSIS — E78.5 DYSLIPIDEMIA: ICD-10-CM

## 2025-04-28 DIAGNOSIS — R73.03 PREDIABETES: ICD-10-CM

## 2025-04-28 DIAGNOSIS — I10 BENIGN HYPERTENSION: ICD-10-CM

## 2025-04-28 DIAGNOSIS — E66.811 OBESITY (BMI 30.0-34.9): ICD-10-CM

## 2025-04-28 RX ORDER — SEMAGLUTIDE 1 MG/.5ML
1 INJECTION, SOLUTION SUBCUTANEOUS WEEKLY
Qty: 2 ML | Refills: 2 | Status: CANCELLED | OUTPATIENT
Start: 2025-04-28

## 2025-04-28 RX ORDER — SEMAGLUTIDE 1.7 MG/.75ML
1.7 INJECTION, SOLUTION SUBCUTANEOUS WEEKLY
Qty: 3 ML | Refills: 2 | Status: SHIPPED | OUTPATIENT
Start: 2025-04-28

## 2025-05-06 ENCOUNTER — OFFICE VISIT (OUTPATIENT)
Dept: FAMILY MEDICINE CLINIC | Facility: CLINIC | Age: 50
End: 2025-05-06
Payer: COMMERCIAL

## 2025-05-06 VITALS
DIASTOLIC BLOOD PRESSURE: 80 MMHG | RESPIRATION RATE: 14 BRPM | HEIGHT: 61 IN | WEIGHT: 159 LBS | HEART RATE: 81 BPM | BODY MASS INDEX: 30.02 KG/M2 | TEMPERATURE: 97.2 F | SYSTOLIC BLOOD PRESSURE: 122 MMHG | OXYGEN SATURATION: 98 %

## 2025-05-06 DIAGNOSIS — N93.8 DYSFUNCTIONAL UTERINE BLEEDING: ICD-10-CM

## 2025-05-06 DIAGNOSIS — E55.9 VITAMIN D DEFICIENCY: ICD-10-CM

## 2025-05-06 DIAGNOSIS — R45.86 MOOD SWINGS: ICD-10-CM

## 2025-05-06 DIAGNOSIS — Z12.11 ENCOUNTER FOR SCREENING FOR MALIGNANT NEOPLASM OF COLON: ICD-10-CM

## 2025-05-06 DIAGNOSIS — R51.9 NONINTRACTABLE EPISODIC HEADACHE, UNSPECIFIED HEADACHE TYPE: ICD-10-CM

## 2025-05-06 DIAGNOSIS — E03.9 ACQUIRED HYPOTHYROIDISM: ICD-10-CM

## 2025-05-06 DIAGNOSIS — I10 BENIGN HYPERTENSION: ICD-10-CM

## 2025-05-06 DIAGNOSIS — E66.811 OBESITY (BMI 30.0-34.9): Primary | ICD-10-CM

## 2025-05-06 DIAGNOSIS — R73.03 PREDIABETES: ICD-10-CM

## 2025-05-06 DIAGNOSIS — F41.9 ANXIETY: ICD-10-CM

## 2025-05-06 DIAGNOSIS — E78.5 DYSLIPIDEMIA: ICD-10-CM

## 2025-05-06 PROCEDURE — 99214 OFFICE O/P EST MOD 30 MIN: CPT | Performed by: PHYSICIAN ASSISTANT

## 2025-05-06 NOTE — PROGRESS NOTES
Subjective   Vashti Hair is a 50 y.o. female who presents today in follow-up of hypertension, dyslipidemia, prediabetes, hypothyroidism, vitamin D deficiency, and moods.    Hypertension  Hypothyroidism  Her past medical history is significant for hyperlipidemia.   Hyperlipidemia  Exacerbating diseases include hypothyroidism.     [facility]- had to cancel GYN and needs to reschedule.   No FHx colon cancer. Never had screening.      Obesity- Lifestyle changes- weight today 159 lbs. Menses now- did not weight this week because of it. Weight here is stable. 3 weeks prior to daughter's wedding, was not walking as she was prior. She did not gain but no loss. Then she lost 2 lbs last week. Just took 1.7 mg- the 1st day, gets a little nauseated like pregnancy. Once she eats, she is ok. She has for about 1 day then better.   Top weight- 186 lbs Weight 2/4/2025 173.2 lbs.  She was doing prepackaged weight watcher meals that were pastas. Has now stopped eating them. Has cut back and is doing 10,000-15,000 steps daily.   Doing weight watchers and walking. Buying the meals already made- she is concerned about sodium. Feeling motivated and has been a long time since she has had motivation. Feeling better and lost several lbs in a few weeks.   She was doing Weight Watchers for a month and had not lost weight.  She was only drinking water. She lost 6 lbs.   Kids were gone and not eating out as much and they were home for a while and now back in college.   Then kids back home.   Has not started Wegovy. Was nervous.   Hypertension- Has not monitored BP in the past month. When has, it has been 105-113/78.   BP has been better on twice daily dosing. No further lower BP that she is aware. Has not checked BP- she is taking Lisinopril 5 mg twice daily. No AE. No symptoms but has not checked at home.   BP was lower in the morning and a little higher in the afternoon- averaging 105/70 in the morning and 130/80 in the  afternoon.   Previous blood pressure at her [facility] was 130/90 in the afternoon and was low in office 12/2019 in the morning.    She had no dizziness with standing and no signs of hypotension.    We changed her Lisinopril 10 mg once daily to 1/2 tablet twice daily.   She previously complained of her heart pounding hard (not fast) intermittent at night several times per week while in school but this resolved when school ended for the summer.   Dyslipidemia- elevated LDL and low HDL 6/2022  Prediabetes- A1c 5.7% 6/2022, 10/2022, and 11/2023  Thyroid- Taking Synthroid 88 mcg daily as directed.   She previously noticed her hair texture was different for a few months- started over the summer. She noticed being very hot when she is trying to get ready and has to have a fan in her bathroom. She continued with increased irritability. No other heat intolerance, diarrhea.   Vitamin D- taking vitamin D 5000 IU daily.    Taking vitamin C.     Moods- moods are doing ok. She gets out of bed easily, motivated to get things done, not anxious. She is feeling good overall on medication.   She was feeling better on Celexa 10 mg- she may have some more feeling up tight but is able to deal with it. It is more like her, so she lux ok. She is doing better with this dosage.   At previous visit, patient reported some mood changes with anxiety, crying, and feeling unlike herself.  She does not typically have mood changes and is pretty calm, so this was abnormal for her.    She was given Celexa 10 mg at bedtime, picked up the medication, but did not start it.    She worked on increasing exercise, mindfulness, and has settling into her new job and her daughter has settled into college.    She then started Celexa 10 mg and has had improvement in moods, reporting she was glad she started it.   She had 1 episode of dizziness on day 4 of medication, stopped the medication x 1 day, then resumed and has not had any recurrence or AE.    7/2021- She  had increased irritability, mood swings, and feeling like moods are less controlled. She is short tempered. She has also had abnormal bleeding- with change in pattern and heaviness of menses.    9/2021- Increased Celexa to 20 mg- she had no worsening or AE but no improvement in irritability, agitation.  She increased Celexa to 40 mg once daily- could not get out of bed, so she went back to 20 mg and did well. She felt better. No longer severe mood swings.   When moving 8/2022- she ran out for 1 week and was crying all the time and felt horrible. Felt better with medication but noticed decreased motivation that was really bothersome.  She was not motivated to exercise at all. She did not have these issues in the past.  Sister has taken something for moods.     Headaches- has hormonal headaches- not this month. The only time she gets them.   She started having headaches- once monthly- distinct headache right before starting menses. Head hurt all over, nausea, photophobia.  It reminded her of when was pregnant and had headache but that wave of nausea.  She was waking up with it.     DUB- Menses now- did not weight this week because of it. Weight here is stable.   She was 3 weeks late. PMS symptoms but no bleeding. Had not been skipping for a while. November, she skipped 2 months then twice in February then normal until now.   She had regular menses. Back to normal but only 3 days and done. She had heavier menses- was 3 days and lighter and now longer and heavier.  She was having very heavy menses. She bled through 3 days in a row. Usually not that heavy.   [facility]- they [facility].     The following portions of the patient's history were reviewed and updated as appropriate: allergies, current medications, past family history, past medical history, past social history, past surgical history and problem list.    Review of Systems  Objective    Vitals:    05/06/25 0831   BP: 122/80   Pulse: 81    Resp: 14   Temp: 97.2 °F (36.2 °C)   SpO2: 98%     Body mass index is 30.06 kg/m².      Physical Exam  Vitals and nursing note reviewed.   Constitutional:       Appearance: She is well-developed.   HENT:      Head: Normocephalic and atraumatic.      Right Ear: External ear normal.      Left Ear: External ear normal.      Nose: Nose normal.   Eyes:      General: Lids are normal.      Conjunctiva/sclera: Conjunctivae normal.   Neck:      Vascular: No carotid bruit.   Cardiovascular:      Rate and Rhythm: Normal rate and regular rhythm.      Heart sounds: Normal heart sounds. No murmur heard.     No friction rub. No gallop.   Pulmonary:      Effort: Pulmonary effort is normal. No respiratory distress.      Breath sounds: Normal breath sounds. No wheezing, rhonchi or rales.   Musculoskeletal:         General: No deformity.      Cervical back: Neck supple.   Skin:     General: Skin is warm and dry.   Neurological:      Mental Status: She is alert and oriented to person, place, and time.      Gait: Gait normal.   Psychiatric:         Speech: Speech normal.         Behavior: Behavior normal.         Thought Content: Thought content normal.           Assessment & Plan   Diagnoses and all orders for this visit:    1. Obesity (BMI 30.0-34.9) (Primary)  -     CBC & Differential; Future  -     Comprehensive Metabolic Panel; Future  -     Hemoglobin A1c; Future  -     Lipid Panel With LDL / HDL Ratio; Future  -     Vitamin D,25-Hydroxy; Future  -     TSH; Future  -     T4, free; Future  -     T3, Free; Future  -     UA / M With / Rflx Culture([facility] ONLY) - Urine, Clean Catch; Future    2. Benign hypertension  -     Comprehensive Metabolic Panel; Future  -     UA / M With / Rflx Culture([facility] ONLY) - Urine, Clean Catch; Future    3. Dyslipidemia  -     Comprehensive Metabolic Panel; Future  -     Lipid Panel With LDL / HDL Ratio; Future    4. Prediabetes  -     CBC & Differential; Future  -     Comprehensive Metabolic  Panel; Future  -     Hemoglobin A1c; Future    5. Acquired hypothyroidism  -     CBC & Differential; Future  -     TSH; Future  -     T4, free; Future  -     T3, Free; Future    6. Vitamin D deficiency  -     Comprehensive Metabolic Panel; Future  -     Vitamin D,25-Hydroxy; Future    7. Anxiety    8. Mood swings    9. Nonintractable episodic headache, unspecified headache type    10. Dysfunctional uterine bleeding    11. Encounter for screening for malignant neoplasm of colon  -     Cologuard - Stool, Per Rectum; Future      Assessment and Plan  Patient will have fasting labs. Call if no results in 1 week. Stability of conditions, plan, follow up, and further recommendations pending labs. Follow up in 3 months. Can complete fasting lab at appt or prior.       Obesity- Patient has been trying to lose weight with diet and exercise for years. She has tried several dietary plans and tried Weight Watchers without success. She has had significant success and is feeling better and increasing exercise. Continue Wegovy 1.7 mg weekly. I will continue to monitor weight and continue dietary and exercise monitoring and counseling.   Hypertension- Blood pressure is stable today. Continue lisinopril 5 mg twice daily and monitor blood pressures. We will need to decrease if low BP with weight loss.   Dyslipidemia- Lipids improved about 30 points. Continue diet and exercise. Await labs for further recommendations  Prediabetes- A1C was stable at 5.7%.  She will continue Wegovy in addition to lifestyle change.  I will continue to monitor and make further recommendations.  Hypothyroidism- Thyroid was stable. Continue Synthroid 88 mcg daily.    Vitamin D deficiency- Vitamin D was stable. Continue vitamin D 5000 IU daily.  Dosing recommendations pending lab results at follow up.  Anxiety- Moods were stable then were more uncontrolled with irritability, mood swings, feeling hot when trying to get ready, and change in menses. Patient could  be perimenopausal. We increased Celexa from 10 mg once daily to 20 mg once daily without improvement in moods then to 40 mg once daily. She did not tolerate increased dose and resumed 20 mg once daily. With decreased motivation, we decreased again to 10 mg.  She has had significant improvement in moods with decreasing again and has had significant improvement with Wegovy. Continue Celexa 10 mg daily. She will call if she has worsening moods or AE with medication.   Consideration of changing to Effexor or Pristiq if uncontrolled moods, symptoms. She will find out what her sister did well with when she went through perimenopause/ menopause to consider that medication. She can also talk with GYN about symptoms and menses. To be seen if worsening moods or AE with medication.    Headache-She should be seen if she has persistent, recurrence, worsening, new or changing symptoms  Menorrhagia, change in menses- Patient to contact her GYN for follow up and to discuss any menstrual changes and symptoms.    I spent 30 minutes caring for Vashti Hair on this date of service. This time includes time spent by me in the following activities as necessary: preparing for the visit, reviewing tests, specialists records and previous visits, obtaining and/or reviewing a separately obtained history, performing a medically appropriate exam and/or evaluation, counseling and educating the patient, family, caregiver, referring and/or communicating with other healthcare professionals, documenting information in the medical record, independently interpreting results and communicating that information with the patient, family, caregiver, and developing a medically appropriate treatment plan with consideration of other conditions, medications, and treatments.

## 2025-05-07 DIAGNOSIS — E03.9 ACQUIRED HYPOTHYROIDISM: ICD-10-CM

## 2025-05-08 RX ORDER — LEVOTHYROXINE SODIUM 88 UG/1
88 TABLET ORAL DAILY
Qty: 90 TABLET | Refills: 1 | Status: SHIPPED | OUTPATIENT
Start: 2025-05-08

## 2025-05-26 PROBLEM — E66.811 OBESITY (BMI 30.0-34.9): Status: ACTIVE | Noted: 2025-05-26

## 2025-05-27 DIAGNOSIS — E66.811 OBESITY (BMI 30.0-34.9): ICD-10-CM

## 2025-05-27 DIAGNOSIS — E78.5 DYSLIPIDEMIA: ICD-10-CM

## 2025-05-27 DIAGNOSIS — R73.03 PREDIABETES: ICD-10-CM

## 2025-05-27 DIAGNOSIS — I10 BENIGN HYPERTENSION: ICD-10-CM

## 2025-05-30 RX ORDER — SEMAGLUTIDE 1.7 MG/.75ML
1.7 INJECTION, SOLUTION SUBCUTANEOUS WEEKLY
Qty: 3 ML | Refills: 2 | Status: SHIPPED | OUTPATIENT
Start: 2025-05-30

## 2025-07-21 ENCOUNTER — RESULTS FOLLOW-UP (OUTPATIENT)
Dept: FAMILY MEDICINE CLINIC | Facility: CLINIC | Age: 50
End: 2025-07-21
Payer: COMMERCIAL

## 2025-07-25 DIAGNOSIS — E78.5 DYSLIPIDEMIA: ICD-10-CM

## 2025-07-25 DIAGNOSIS — E66.811 OBESITY (BMI 30.0-34.9): ICD-10-CM

## 2025-07-25 DIAGNOSIS — I10 BENIGN HYPERTENSION: ICD-10-CM

## 2025-07-25 DIAGNOSIS — R73.03 PREDIABETES: ICD-10-CM

## 2025-07-25 RX ORDER — SEMAGLUTIDE 1.7 MG/.75ML
1.7 INJECTION, SOLUTION SUBCUTANEOUS WEEKLY
Qty: 3 ML | Refills: 2 | Status: CANCELLED | OUTPATIENT
Start: 2025-07-25

## 2025-07-25 RX ORDER — SEMAGLUTIDE 2.4 MG/.75ML
2.4 INJECTION, SOLUTION SUBCUTANEOUS WEEKLY
Qty: 3 ML | Refills: 2 | Status: SHIPPED | OUTPATIENT
Start: 2025-07-25

## 2025-07-29 DIAGNOSIS — F41.9 ANXIETY: ICD-10-CM

## 2025-07-29 RX ORDER — CITALOPRAM HYDROBROMIDE 10 MG/1
10 TABLET ORAL DAILY
Qty: 90 TABLET | Refills: 1 | Status: SHIPPED | OUTPATIENT
Start: 2025-07-29

## 2025-08-18 ENCOUNTER — TELEPHONE (OUTPATIENT)
Dept: FAMILY MEDICINE CLINIC | Facility: CLINIC | Age: 50
End: 2025-08-18